# Patient Record
Sex: MALE | Race: BLACK OR AFRICAN AMERICAN
[De-identification: names, ages, dates, MRNs, and addresses within clinical notes are randomized per-mention and may not be internally consistent; named-entity substitution may affect disease eponyms.]

---

## 2017-09-26 ENCOUNTER — HOSPITAL ENCOUNTER (OUTPATIENT)
Dept: HOSPITAL 72 - PAN | Age: 82
Discharge: HOME | End: 2017-09-26
Payer: MEDICARE

## 2017-09-26 VITALS — BODY MASS INDEX: 26.16 KG/M2 | HEIGHT: 65 IN | WEIGHT: 157 LBS

## 2017-09-26 VITALS — DIASTOLIC BLOOD PRESSURE: 51 MMHG | SYSTOLIC BLOOD PRESSURE: 135 MMHG

## 2017-09-26 DIAGNOSIS — K21.9: ICD-10-CM

## 2017-09-26 DIAGNOSIS — D64.9: ICD-10-CM

## 2017-09-26 DIAGNOSIS — Z43.1: Primary | ICD-10-CM

## 2017-09-26 DIAGNOSIS — F03.90: ICD-10-CM

## 2017-09-26 DIAGNOSIS — N18.6: ICD-10-CM

## 2017-09-26 DIAGNOSIS — M19.90: ICD-10-CM

## 2017-09-26 DIAGNOSIS — K86.1: ICD-10-CM

## 2017-09-26 DIAGNOSIS — E03.9: ICD-10-CM

## 2017-09-26 DIAGNOSIS — I12.0: ICD-10-CM

## 2017-09-26 PROCEDURE — 99201: CPT

## 2017-09-26 NOTE — GI INITIAL CONSULT NOTE
History of Present Illness


General


Date patient seen:  Sep 26, 2017


Time patient seen:  10:00


Referring physician:  REESE


Reason for Consultation:  GTUBE REMOVAL





Present Illness


HPI


81 year old male patient referred by Dr. Dean for G Tube removal today.  The 

patient had the Gtube palced in 2016 due to dental issues and was unable to eat 

at the time.  Currently okay with oral intake and PO meds.  Has not been using 

the GT.  Denies any weight loss or changes in dietary habits.


Home Meds


Reported Medications


Acetaminophen* (ACETAMINOPHEN 325MG TABLET*) 325 Mg Tablet, 650 MG ORAL Q4H Y 

for Pain Scale (3-5), TAB


   9/26/17


Tamsulosin Hcl (TAMSULOSIN HCL*) 0.4 Mg Cap.er.24h, 0.4 MG GT BEDTIME, CAP


   9/26/17


Omeprazole (OMEPRAZOLE) 40 Mg Capsule.dr, 40 MG GT DAILY, CAP


   9/26/17


Olanzapine* (ZYPREXA*) 2.5 Mg Tablet, 2.5 MG GT DAILY, #30 TAB 0 Refills


   9/26/17


Vitamin B Cmplx/Vit C/Folic AC (Nephro-Saadia Tablet) 0.8 Mg Tablet, 1 TAB GT 

DAILY, #30 TAB 0 Refills


   9/26/17


Minoxidil* (LONITEN*) 2.5 Mg Tablet, 5 MG GT DAILY, TAB


   9/26/17


Magnesium Hydroxide* (MILK OF MAGNESIA*) 400 Mg/5 Ml Oral.susp, 30 ML ORAL PRN, 

ML


   9/26/17


Lorazepam* (LORAZEPAM*) 1 Mg Tablet, 1 MG ORAL Q8HR, TAB


   9/26/17


Levothyroxine Sodium* (LEVOTHYROXINE SODIUM*) 125 Mcg Tablet, 125 MCG GT DAILY, 

TAB


   Take in the morning on an empty stomach, at least 30 minutes before


   food.


   9/26/17


Levetiracetam* (LEVETIRACETAM*) 100 Mg/1 Ml Solution, 500 MG GT BID


   9/26/17


Labetalol Hcl* (NORMODYNE*) 100 Mg Tablet, 400 MG GT EVERY 12 HOURS, TAB


   9/26/17


Hydralazine Hcl* (HYDRALAZINE HCL*) 25 Mg Tablet, 25 MG GT Q6HR, TAB 0 Refills


   9/26/17


Cranberry Fruit Concentrate (CRANBERRY) 450 Mg Tablet, 450 MG GT DAILY, TAB


   9/26/17


Docusate Sodium* (COLACE*) 100 Mg Capsule, 100 MG GT DAILY, CAP


   9/26/17


Amlodipine Besylate* (AMLODIPINE BESYLATE*) 5 Mg Tablet, 5 MG GT DAILY, TAB


   9/26/17


Med list reviewed/reconciled:  Yes


Allergies:  


Coded Allergies:  


     No Known Allergies (Unverified , 9/26/17)





Patient History


History Provided By:  Patient


PMH Narrative


Anemia


ESRD


NSTEMI


hypothyroidism


chronic pancreatitis


arthritis


dementia


GERD


HTN


Social History:  Denies: smoking, alcohol use, drug use, other





Review of Systems


All Other Systems:  limited





Physical Exam





Vital Signs








  Date Time  Temp Pulse Resp B/P (MAP) Pulse Ox O2 Delivery O2 Flow Rate FiO2


 


9/26/17 09:38 98.1 65 16 135/51    








Sp02 EP Interpretation:  reviewed


General Appearance:  well appearing, no apparent distress, alert


Head:  normocephalic


EENT:  PERRL/EOMI, normal ENT inspection


Neck:  supple


Respiratory:  normal breath sounds, no respiratory distress


Cardiovascular:  regular rhythm


Gastrointestinal:  normal inspection, non tender, soft, gt - present


Rectal:  deferred


Genitourinary:  no CVA tenderness


Musculoskeletal:  normal inspection, back normal, other - ambulates with cane


Neurologic:  normal inspection, alert, oriented x3, responsive


Psychiatric:  normal inspection, other - forgetful


Skin:  normal inspection, normal color, no rash, warm/dry


Lymphatic:  normal inspection, no adenopathy





GI: Plan


Problems:  


(1) PEG (percutaneous endoscopic gastrostomy) adjustment/replacement/removal


(2) Anemia


(3) ESRD (end stage renal disease)


(4) Hypothyroidism


(5) Chronic pancreatitis


(6) Arthritis


(7) Dementia


(8) GERD (gastroesophageal reflux disease)


(9) HTN (hypertension)


Plan


GT removed, patient tolerated procedure well.


- do not shower for 24 hours.


- dressing change prn


RTC x 3 months


will consider colonoscopy





Thank you for referring this patient.











Chani Lopez N.P. Sep 26, 2017 11:07

## 2018-01-09 ENCOUNTER — HOSPITAL ENCOUNTER (OUTPATIENT)
Dept: HOSPITAL 72 - PAN | Age: 83
Discharge: HOME | End: 2018-01-09
Payer: MEDICARE

## 2018-01-09 VITALS — SYSTOLIC BLOOD PRESSURE: 124 MMHG | DIASTOLIC BLOOD PRESSURE: 42 MMHG

## 2018-01-09 DIAGNOSIS — Z43.1: Primary | ICD-10-CM

## 2018-01-09 DIAGNOSIS — F03.90: ICD-10-CM

## 2018-01-09 DIAGNOSIS — D64.9: ICD-10-CM

## 2018-01-09 PROCEDURE — 99212 OFFICE O/P EST SF 10 MIN: CPT

## 2018-01-09 NOTE — GI PROGRESS NOTE
Assessment/Plan


Problems:  


(1) Anemia


ICD Codes:  D64.9 - Anemia, unspecified


SNOMED:  294425884


(2) Dementia


ICD Codes:  F03.90 - Unspecified dementia without behavioral disturbance


SNOMED:  78944585


(3) PEG (percutaneous endoscopic gastrostomy) adjustment/replacement/removal


ICD Codes:  Z43.1 - Encounter for attention to gastrostomy


SNOMED:  645905385, 450145255


Status:  stable


Status Narrative


Discussed with Dr. Gupta.


Assessment/Plan


anemia labs reviewed >>


- Hgb 9.0, Sept 2017


- Hgb 14.9, 1/2/18.


s/p GT site removal assessed.


cont plan of care


RTC x 6 months





Subjective


Subjective


denies blood in stool


decrease in appetite





Objective


T 97.8


/42


P 63


98 RA








98 RA


General Appearance:  WD/WN, no apparent distress, alert


Cardiovascular:  normal rate


Respiratory/Chest:  normal breath sounds, no respiratory distress


Abdominal Exam:  normal bowel sounds, non tender, soft


Extremities:  normal range of motion, non-tender











Chani Lopez N.PMoody Jan 9, 2018 10:50

## 2018-03-06 ENCOUNTER — HOSPITAL ENCOUNTER (OUTPATIENT)
Dept: HOSPITAL 72 - ANE | Age: 83
Discharge: HOME | End: 2018-03-06
Payer: MEDICARE

## 2018-03-06 VITALS — DIASTOLIC BLOOD PRESSURE: 75 MMHG | SYSTOLIC BLOOD PRESSURE: 129 MMHG

## 2018-03-06 DIAGNOSIS — D64.9: Primary | ICD-10-CM

## 2018-03-07 NOTE — GI PROGRESS NOTE
Assessment/Plan


Problems:  


(1) Colonoscopy planned


SNOMED:  916847587


(2) Anemia


ICD Codes:  D64.9 - Anemia, unspecified


SNOMED:  711561070


Status:  stable


Status Narrative


Seen with Dr. Gupta.


Assessment/Plan


EGD/colonoscopy scheduled 3/12/18


- CLD & (Nulytely/Suprep/Movi-Prep) prep instructions given and acknowledged by 

patient.


- NPO @ [facility] day prior procedure explained.


The patient was seen and examined at bedside and all new and available data was 

reviewed in the patients chart. I agree with the above findings, impression 

and plan.  (Patient seen earlier today. Signature stamp does not reflect 

patient encounter time.). - Chata Gupta MD





Subjective


Gastrointestinal/Abdominal:  Reports: no symptoms


Subjective


s/p GT removal


weight loss





Objective


T 98.1


/45


P 68


General Appearance:  WD/WN, no apparent distress, alert


Cardiovascular:  normal rate


Respiratory/Chest:  normal breath sounds, no respiratory distress


Abdominal Exam:  normal bowel sounds, non tender, soft


Extremities:  normal range of motion, non-tender











Chani Lopez N.PMoody Mar 7, 2018 15:33


FATOU GUPTA Mar 14, 2018 11:30

## 2018-03-13 NOTE — ANETHESIA PREOPERATIVE EVAL
Anesthesia Pre-op PMH/ROS


General


Date of Evaluation:  Mar 13, 2018


Time of Evaluation:  06:54


Anesthesiologist:  juan r


ASA Score:  ASA 4


Mallampati Score


Class I : Soft palate, uvula, fauces, pillars visible


Class II: Soft palate, uvula, fauces visible


Class III: Soft palate, base of uvula visible


Class IV: Only hard plate visible


Mallampati Classification:  Class II


Surgeon:  jaymie


Diagnosis:  anemia


Surgical Procedure:  egd/colonoscopy


Anesthesia History:  none


Family History:  no anesthesia problems


Allergies:  


Coded Allergies:  


     No Known Allergies (Unverified , 9/26/17)


Medications:  see eMAR





Past Medical History


Cardiovascular:  Reports: HTN


Pulmonary:  Reports: COPD


Gastrointestinal/Genitourinary:  Reports: GERD, ESRD, other - pancreatitis


Neurologic/Psychiatric:  Reports: dementia, CVA


Endocrine:  Reports: DM, hypothyroidism


Hematology/Immune:  Reports: anemia


Musculoskeletal/Integumentary:  Reports: OA





Anesthesia Pre-op Phys. Exam


Physician Exam


Constitutional:  NAD


Neurologic:  CN 2-12 intact


Cardiovascular:  RRR


Respiratory:  CTA


Gastrointestinal:  S/NT/ND





Airway Exam


Mallampati Score:  Class II


MO:  limited


Neck:  short


TMD:  2fb


ROM:  limited





Anesthesia Pre-op A/P


Risk Assessment & Plan


Assessment:


asa4


Plan:


mac


Status Change Before Surgery:  No





Pre-Antibiotics


Drug:  SHELLI Tejada Mar 13, 2018 07:01

## 2018-03-20 ENCOUNTER — HOSPITAL ENCOUNTER (OUTPATIENT)
Dept: HOSPITAL 72 - GAS | Age: 83
Discharge: HOME | End: 2018-03-20
Payer: MEDICARE

## 2018-03-20 VITALS — DIASTOLIC BLOOD PRESSURE: 55 MMHG | SYSTOLIC BLOOD PRESSURE: 151 MMHG

## 2018-03-20 VITALS — SYSTOLIC BLOOD PRESSURE: 120 MMHG | DIASTOLIC BLOOD PRESSURE: 49 MMHG

## 2018-03-20 VITALS — DIASTOLIC BLOOD PRESSURE: 46 MMHG | SYSTOLIC BLOOD PRESSURE: 145 MMHG

## 2018-03-20 VITALS — SYSTOLIC BLOOD PRESSURE: 141 MMHG | DIASTOLIC BLOOD PRESSURE: 52 MMHG

## 2018-03-20 VITALS — DIASTOLIC BLOOD PRESSURE: 55 MMHG | SYSTOLIC BLOOD PRESSURE: 140 MMHG

## 2018-03-20 VITALS — WEIGHT: 150 LBS | BODY MASS INDEX: 22.73 KG/M2 | HEIGHT: 68 IN

## 2018-03-20 VITALS — SYSTOLIC BLOOD PRESSURE: 142 MMHG | DIASTOLIC BLOOD PRESSURE: 55 MMHG

## 2018-03-20 VITALS — DIASTOLIC BLOOD PRESSURE: 75 MMHG | SYSTOLIC BLOOD PRESSURE: 154 MMHG

## 2018-03-20 DIAGNOSIS — K29.50: ICD-10-CM

## 2018-03-20 DIAGNOSIS — K63.5: ICD-10-CM

## 2018-03-20 DIAGNOSIS — K29.70: ICD-10-CM

## 2018-03-20 DIAGNOSIS — M19.90: ICD-10-CM

## 2018-03-20 DIAGNOSIS — D12.3: ICD-10-CM

## 2018-03-20 DIAGNOSIS — N18.6: ICD-10-CM

## 2018-03-20 DIAGNOSIS — Z99.2: ICD-10-CM

## 2018-03-20 DIAGNOSIS — Z12.11: Primary | ICD-10-CM

## 2018-03-20 DIAGNOSIS — J44.9: ICD-10-CM

## 2018-03-20 DIAGNOSIS — K64.8: ICD-10-CM

## 2018-03-20 DIAGNOSIS — K44.9: ICD-10-CM

## 2018-03-20 DIAGNOSIS — Z79.82: ICD-10-CM

## 2018-03-20 DIAGNOSIS — I13.11: ICD-10-CM

## 2018-03-20 DIAGNOSIS — K21.9: ICD-10-CM

## 2018-03-20 DIAGNOSIS — K22.2: ICD-10-CM

## 2018-03-20 DIAGNOSIS — D64.9: ICD-10-CM

## 2018-03-20 DIAGNOSIS — E03.9: ICD-10-CM

## 2018-03-20 DIAGNOSIS — I25.2: ICD-10-CM

## 2018-03-20 DIAGNOSIS — E11.22: ICD-10-CM

## 2018-03-20 PROCEDURE — 45380 COLONOSCOPY AND BIOPSY: CPT

## 2018-03-20 PROCEDURE — 36415 COLL VENOUS BLD VENIPUNCTURE: CPT

## 2018-03-20 PROCEDURE — 84132 ASSAY OF SERUM POTASSIUM: CPT

## 2018-03-20 PROCEDURE — 82962 GLUCOSE BLOOD TEST: CPT

## 2018-03-20 PROCEDURE — 93005 ELECTROCARDIOGRAM TRACING: CPT

## 2018-03-20 PROCEDURE — 43239 EGD BIOPSY SINGLE/MULTIPLE: CPT

## 2018-03-20 PROCEDURE — 94150 VITAL CAPACITY TEST: CPT

## 2018-03-20 PROCEDURE — 94003 VENT MGMT INPAT SUBQ DAY: CPT

## 2018-03-20 NOTE — 48 HOUR POST ANESTHESIA EVAL
Post Anesthesia Evaluation


Procedure:  EGD and colonoscopy


Date of Evaluation:  Mar 20, 2018


Time of Evaluation:  14:30


Blood Pressure Systolic:  142


0:  55


Pulse Rate:  72


Respiratory Rate:  20


Temperature (Fahrenheit):  98


O2 Sat by Pulse Oximetry:  100


Airway:  patent


Nausea:  No


Vomiting:  No


Pain Intensity:  0


Hydration Status:  adequate


Cardiopulmonary Status:


at baseline


Mental Status/LOC:  patient returned to baseline


Post-Anesthesia Complications:


0


Follow-up care needed:  ready to discharge











MADY MOSCOSO M.D. Mar 20, 2018 12:10

## 2018-03-20 NOTE — SHORT STAY SURGERY H&P
History of Present Illness


History of Present Illness


Chief Complaint


see recent office note


HPI


Mansoor Horton is a 82 year old male who was admitted on  for Anemia





Patient History


Allergies:  


Coded Allergies:  


     No Known Allergies (Unverified , 3/20/18)


PAST MEDICAL HISTORY:  


Past Surgeries:  


Social History:  





Medication History


Scheduled


Amlodipine Besylate* (Amlodipine Besylate*), 5 MG PO BID, (Reported)


Aspirin* (Aspir 81*), 81 MG ORAL DAILY, (Reported)


Divalproex Sodium* (Depakote Er*), 250 MG ORAL TID, (Reported)


Hydralazine Hcl* (Hydralazine Hcl*), 25 MG PO QID, (Reported)


Labetalol Hcl* (Normodyne*), 400 MG PO EVERY 12 HOURS, (Reported)


Levetiracetam* (Levetiracetam*), 500 MG PO BID, (Reported)


Levothyroxine Sodium* (Levothyroxine Sodium*), 125 MCG PO DAILY, (Reported)


Minoxidil* (Loniten*), 5 MG PO DAILY, (Reported)


Tamsulosin Hcl (Tamsulosin Hcl*), 0.4 MG PO BEDTIME, (Reported)


Vitamin B Cmplx/Vit C/Folic AC (Nephro-Saadia Tablet), 1 TAB PO DAILY, (Reported)





Discontinued Medications


Acetaminophen* (Acetaminophen 325MG Tablet*), 650 MG ORAL Q4H PRN for Pain 

Scale (3-5), (Reported)


   Discontinued Reason: Pt stopped taking med


Cranberry Fruit Concentrate (Cranberry), 450 MG GT DAILY, (Reported)


   Discontinued Reason: Pt stopped taking med


Docusate Sodium* (Colace*), 100 MG GT DAILY, (Reported)


   Discontinued Reason: Pt stopped taking med


Lorazepam* (Lorazepam*), 1 MG ORAL Q8HR, (Reported)


   Discontinued Reason: Pt stopped taking med


Magnesium Hydroxide* (Milk Of Magnesia*), 30 ML ORAL PRN, (Reported)


   Discontinued Reason: Pt stopped taking med


Olanzapine* (Zyprexa*), 2.5 MG GT DAILY, (Reported)


   Discontinued Reason: Pt stopped taking med


Omeprazole (Omeprazole), 40 MG GT DAILY, (Reported)


   Discontinued Reason: Pt stopped taking med





Physical Exam


Vital Signs





Last Vital Signs








  Date Time  Temp Pulse Resp B/P (MAP) Pulse Ox O2 Delivery O2 Flow Rate FiO2


 


3/20/18 10:54 98.3 67 18 154/75 99 Room Air  





 98.3       








Labs





Laboratory Tests








Test


  3/20/18


10:20


 


Potassium Level


  4.9 MMOL/L


(3.5-5.1)











Plan


Attestation


Are the patient's medical conditions optimized for surgery?











FATOU CRUZ Mar 20, 2018 12:25

## 2018-03-20 NOTE — PRE-PROCEDURE NOTE/ATTESTATION
Pre-Procedure Note/Attestation


Complete Prior to Procedure


Planned Procedure:  not applicable


Procedure Narrative:


esophagogastroduodenoscopy and colonoscopy





Indications for Procedure


Pre-Operative Diagnosis:


screening colon, GERD





Attestation


I attest that I discussed the nature of the procedure; its benefits; risks and 

complications; and alternatives (and the risks and benefits of such alternatives

), prior to the procedure, with the patient (or the patient's legal 

representative).





I attest that, if there was a reasonable possibility of needing a blood 

transfusion, the patient (or the patient's legal representative) was given the 

[facility] standardized written summary, pursuant 

to the Jermaine Burley Blood Safety Act ([place] Health and Safety Code # 1645, as 

amended).





I attest that I re-evaluated the patient just prior to the surgery and that 

there has been no change in the patient's H&P, except as documented below:











FATOU CRUZ Mar 20, 2018 12:24

## 2018-03-20 NOTE — ANETHESIA PREOPERATIVE EVAL
Anesthesia Pre-op PMH/ROS


General


Date of Evaluation:  Mar 20, 2018


Anesthesiologist:  [place]


ASA Score:  ASA 4


Mallampati Score


Class I : Soft palate, uvula, fauces, pillars visible


Class II: Soft palate, uvula, fauces visible


Class III: Soft palate, base of uvula visible


Class IV: Only hard plate visible


Mallampati Classification:  Class II


Surgeon:  Candy


Diagnosis:  ?GI bleed


Surgical Procedure:  EGD and colonoscopy


Anesthesia History:  none


Family History:  no anesthesia problems


Allergies:  


Coded Allergies:  


     No Known Allergies (Unverified , 3/20/18)


Medications:  see eMAR





Past Medical History


Cardiovascular:  Reports: HTN, CAD, MI, other - h/o NSTEMI, 


   Denies: valve dz, arrhythmia


Pulmonary:  Reports: COPD, 


   Denies: asthma, BANDAR, other


Gastrointestinal/Genitourinary:  Reports: GERD, ESRD - on dialysis-M,W,F, 


   Denies: CRI, other


Neurologic/Psychiatric:  Reports: dementia, CVA, depression/anxiety, other - 

seizures, schizophrenia, 


   Denies: TIA


Endocrine:  Reports: DM, hypothyroidism, 


   Denies: steroids, other


HEENT:  Denies: cataract (L), cataract (R), glaucoma, [place] (L), [place] (R), other


Hematology/Immune:  Reports: anemia, 


   Denies: DVT, bleeding disorder, other


Musculoskeletal/Integumentary:  Reports: OA, DJD, 


   Denies: RA, DDD, edema, other


PSxH Narrative:


Fistula





Anesthesia Pre-op Phys. Exam


Physician Exam


see chart


Constitutional:  NAD


Cardiovascular:  RRR


Respiratory:  CTA





Airway Exam


Mallampati Score:  Class II


MO:  full


ROM:  full


Teeth:  intact





Anesthesia Pre-op A/P


Labs





Chemistry








Test


  3/20/18


10:20


 


Potassium Level


  4.9 MMOL/L


(3.5-5.1)











Studies


Pre-op Studies:  EKG - sr





Risk Assessment & Plan


Assessment:


ASA IV


Plan:


MAC


Status Change Before Surgery:  No





Pre-Antibiotics


Drug:  N/A











MADY MOSCOSO M.D. Mar 20, 2018 10:51

## 2018-03-20 NOTE — ENDOSCOPY PROCEDURE NOTE
Endoscopy Procedure Note


General


Indication for Procedure:  SCREENING COLON, gerd


Procedures Performed:  EGD, colonoscopy


Operative Findings/Diagnosis:  ONE COLON POLYP, GASTRITIS


Specimen:  yes


Pt Tolerated Procedure Well:  Yes


Estimated Blood Loss:  none





Anesthesia


Anesthesiologist:  [place]


Anesthesia:  MAC





Inserted Devices


Implant(s) used?:  No





Quality


Quality of Bowel Preparation:  Fair


Did scope reach the cecum?:  Yes


Was there any complications?:  No





GI Core Measures


50 yrs or older w/o bx or poly:  No


10yrs. F/U not recommended:  Yes


If not recommended, why?:  Above average risk


10 yrs. F/U needed:  No











FATOU CRUZ Mar 20, 2018 13:02

## 2018-03-20 NOTE — IMMEDIATE POST-OP EVALUATION
Immediate Post-Op Evalulation


Immediate Post-Op Evalulation


Procedure:  EGD and colonoscopy


Date of Evaluation:  Mar 20, 2018


Time of Evaluation:  13:22


IV Fluids:  300


Blood Products:  0


Estimated Blood Loss:  0


Urinary Output:  0


Blood Pressure Systolic:  151


Blood Pressure Diastolic:  55


Pulse Rate:  69


Respiratory Rate:  18


O2 Sat by Pulse Oximetry:  98


Temperature (Fahrenheit):  97.7


Pain Score (1-10):  0


Nausea:  No


Vomiting:  No


Complications


0


Patient Status:  awake, reacts, patent, none


Hydration Status:  adequate


Drug:  N/A











MADY MOSCOSO M.D. Mar 20, 2018 12:10

## 2018-03-20 NOTE — PROCEDURE NOTE
DATE OF PROCEDURE:  03/20/2018



SURGEON:  Alexy Gupta M.D.



PROCEDURE:  Upper endoscopy with biopsy and colonoscopy with

biopsy.



ANESTHESIA:  Per Dr. Bear.



INSTRUMENT:  Olympus adult flexible upper endoscope and

colonoscope.



INDICATION:  Screening colonoscopy evaluation and chronic anemia.



The procedure, risks, benefits, and possible consequences, including

hemorrhage, aspiration, perforation and infection, and alternative

treatments, were explained to the patient/legal guardian by Dr. Alexy Gupta and the patient/legal guardian understood and accepted these

risks.



DESCRIPTION OF PROCEDURE:  After informed consent was obtained and the

patient was adequately sedated, Olympus upper endoscope was advanced from

the mouth into the second portion of the duodenum and retroflexion was

performed in the stomach.



The patient had a small esophageal ring, small hiatal hernia, diffuse

gastritis.  Random biopsy from body and antrum was obtained to rule out H.

pylori infection.  At this time, the upper endoscope was retrieved.  The

patient was turned over for colonoscopy.



First, rectal exam was performed, which was normal.  Then, the scope was

advanced from the rectum into the cecum.  Quality of prep was poor.



30% of the colonic mucosa was not seen in this examination given the

poor prep.



The patient had one diminutive polyp in the transverse colon, which was

removed with cold biopsy forceps technique.



The patient had retroflexion of the rectum, which showed evidence of a

medium-sized internal hemorrhoid.



The patient tolerated the procedure very well without any

complication.



SUMMARY OF FINDINGS:

1. Distal esophageal ring.

2. Small hiatal hernia.

3. Gastritis, status post biopsy.

4. One colonic polyp, removed, see above for details.

5. Poor colonic prep.

6. Internal hemorrhoids.



RECOMMENDATIONS:  Follow biopsy results and treat accordingly.









  ______________________________________________

  Alexy Gupta M.D.





DR:  MIGUEL

D:  03/20/2018 13:07

T:  03/20/2018 17:44

JOB#:  [id number]

CC:

## 2018-03-21 VITALS — SYSTOLIC BLOOD PRESSURE: 142 MMHG | DIASTOLIC BLOOD PRESSURE: 55 MMHG

## 2018-03-21 NOTE — CARDIOLOGY REPORT
--------------- APPROVED REPORT --------------





EKG Measurement

Heart [id number]

[place] [id number]

[id number]

[id number]

[id number]





Sinus rhythm with 1st degree AV block

Septal infarct, age undetermined

Abnormal ECG

## 2018-03-21 NOTE — PROCEDURE NOTE
DATE OF PROCEDURE:  03/20/2018



NOTE:  POOR AUDIO



SURGEON:  Alexy Gupta M.D.



PROCEDURE:  Upper endoscopy with biopsy and colonoscopy _____.



ANESTHESIA:  Dr. Bear.



INSTRUMENT:  Olympus adult flexible endoscope and colonoscope.



INDICATION:  Anemia and screening colonoscopy evaluation.



REASON FOR PROCEDURE:  The procedure, risks, benefits, and possible

consequences, including hemorrhage, aspiration, perforation and infection,

and alternative treatments, were explained to the patient/legal guardian

by Dr. Alexy Gupta and the patient/legal guardian understood and

accepted these risks.



DESCRIPTION OF PROCEDURE:  After informed consent was obtained and the

patient was adequately sedated, Olympus upper endoscope was advanced from

mouth to second portion of duodenum and retroflexion was performed in the

stomach.  _____ small hiatal hernia, otherwise upper endoscope was normal.

Gastritis was seen in the body and the antrum, which was biopsied to rule

out H. pylori infection.  At this time, the upper endoscope was retrieved

and the patient was turned over for colonoscopy.



First, rectal exam was performed _____.  Then, the scope was advanced

from the rectum into the cecum.  Quality of prep was poor.



_____ see about ______.



_____



SUMMARY OF FINDINGS:

1. _____ gastritis _____.

2. ______ prep ______.

3. ______

4. ______



RECOMMENDATIONS:  Follow up biopsy results and treat accordingly.









  ______________________________________________

  Alexy Gupta M.D.





DR:  MIGUEL

D:  03/20/2018 13:04

T:  03/21/2018 03:07

JOB#:  [id number]

CC:

## 2018-04-19 ENCOUNTER — HOSPITAL ENCOUNTER (OUTPATIENT)
Dept: HOSPITAL 72 - PAN | Age: 83
Discharge: HOME | End: 2018-04-19
Payer: MEDICARE

## 2018-04-19 DIAGNOSIS — K44.9: ICD-10-CM

## 2018-04-19 DIAGNOSIS — K29.70: ICD-10-CM

## 2018-04-19 DIAGNOSIS — K64.8: ICD-10-CM

## 2018-04-19 DIAGNOSIS — K63.5: ICD-10-CM

## 2018-04-19 DIAGNOSIS — K21.9: Primary | ICD-10-CM

## 2018-04-19 DIAGNOSIS — D64.9: ICD-10-CM

## 2018-04-19 DIAGNOSIS — F03.90: ICD-10-CM

## 2018-04-19 LAB
ADD MANUAL DIFF: NO
ALBUMIN SERPL-MCNC: 3.1 G/DL (ref 3.4–5)
ALBUMIN/GLOB SERPL: 0.6 {RATIO} (ref 1–2.7)
ALP SERPL-CCNC: 170 U/L (ref 46–116)
ALT SERPL-CCNC: 18 U/L (ref 12–78)
ANION GAP SERPL CALC-SCNC: 11 MMOL/L (ref 5–15)
AST SERPL-CCNC: 18 U/L (ref 15–37)
BASOPHILS NFR BLD AUTO: 0.5 % (ref 0–2)
BILIRUB SERPL-MCNC: 0.3 MG/DL (ref 0.2–1)
BUN SERPL-MCNC: 56 MG/DL (ref 7–18)
CALCIUM SERPL-MCNC: 9.2 MG/DL (ref 8.5–10.1)
CHLORIDE SERPL-SCNC: 99 MMOL/L (ref 98–107)
CO2 SERPL-SCNC: 26 MMOL/L (ref 21–32)
CREAT SERPL-MCNC: 4.9 MG/DL (ref 0.55–1.3)
EOSINOPHIL NFR BLD AUTO: 12.9 % (ref 0–3)
ERYTHROCYTE [DISTWIDTH] IN BLOOD BY AUTOMATED COUNT: 15.2 % (ref 11.6–14.8)
GLOBULIN SER-MCNC: 4.9 G/DL
HCT VFR BLD CALC: 39.7 % (ref 42–52)
HGB BLD-MCNC: 12.7 G/DL (ref 14.2–18)
LYMPHOCYTES NFR BLD AUTO: 21.1 % (ref 20–45)
MCV RBC AUTO: 89 FL (ref 80–99)
MONOCYTES NFR BLD AUTO: 10.8 % (ref 1–10)
NEUTROPHILS NFR BLD AUTO: 54.7 % (ref 45–75)
PLATELET # BLD: 215 K/UL (ref 150–450)
POTASSIUM SERPL-SCNC: 4.5 MMOL/L (ref 3.5–5.1)
RBC # BLD AUTO: 4.48 M/UL (ref 4.7–6.1)
SODIUM SERPL-SCNC: 136 MMOL/L (ref 136–145)
WBC # BLD AUTO: 5.8 K/UL (ref 4.8–10.8)

## 2018-04-19 PROCEDURE — 36415 COLL VENOUS BLD VENIPUNCTURE: CPT

## 2018-04-19 PROCEDURE — 80053 COMPREHEN METABOLIC PANEL: CPT

## 2018-04-19 PROCEDURE — 85025 COMPLETE CBC W/AUTO DIFF WBC: CPT

## 2018-04-19 PROCEDURE — 99212 OFFICE O/P EST SF 10 MIN: CPT

## 2018-04-19 PROCEDURE — 82378 CARCINOEMBRYONIC ANTIGEN: CPT

## 2018-04-19 NOTE — GI PROGRESS NOTE
Assessment/Plan


Problems:  


(1) Anemia


ICD Codes:  D64.9 - Anemia, unspecified


SNOMED:  931393349


(2) Dementia


ICD Codes:  F03.90 - Unspecified dementia without behavioral disturbance


SNOMED:  61843226


(3) GERD (gastroesophageal reflux disease)


ICD Codes:  K21.9 - Gastro-esophageal reflux disease without esophagitis


SNOMED:  725907711


Status:  unchanged


Status Narrative


Seen with Dr. Gupta.


Assessment/Plan


SUMMARY OF FINDINGS reviewed with patient:


1. Distal esophageal ring.


2. Small hiatal hernia.


3. Gastritis, status post biopsy.


4. One colonic polyp, removed, see above for details.


5. Poor colonic prep.


6. Internal hemorrhoids.





RECOMMENDATIONS:


Follow biopsy results and treat accordingly.


Labs drawn today >> CBC, CEA, , CMP


RTC after lab studies





Subjective


Gastrointestinal/Abdominal:  Reports: no symptoms





Objective


General Appearance:  WD/WN, no apparent distress, alert


Cardiovascular:  normal rate


Respiratory/Chest:  normal breath sounds, no respiratory distress


Abdominal Exam:  normal bowel sounds, non tender, soft


Extremities:  normal range of motion, non-tender











Chani Lopez N.P. Apr 19, 2018 14:47

## 2018-08-30 ENCOUNTER — HOSPITAL ENCOUNTER (OUTPATIENT)
Dept: HOSPITAL 72 - PAN | Age: 83
Discharge: HOME | End: 2018-08-30
Payer: MEDICARE

## 2018-08-30 DIAGNOSIS — F03.90: ICD-10-CM

## 2018-08-30 DIAGNOSIS — Z43.1: Primary | ICD-10-CM

## 2018-08-30 DIAGNOSIS — K21.9: ICD-10-CM

## 2018-08-30 DIAGNOSIS — D64.9: ICD-10-CM

## 2018-08-30 DIAGNOSIS — K59.00: ICD-10-CM

## 2018-08-30 PROCEDURE — 99212 OFFICE O/P EST SF 10 MIN: CPT

## 2018-08-30 NOTE — GI PROGRESS NOTE
Assessment/Plan


Problems:  


(1) Constipation


ICD Codes:  K59.00 - Constipation, unspecified


SNOMED:  76101885


(2) PEG (percutaneous endoscopic gastrostomy) adjustment/replacement/removal


ICD Codes:  Z43.1 - Encounter for attention to gastrostomy


SNOMED:  311884826, 172543766


(3) Anemia


ICD Codes:  D64.9 - Anemia, unspecified


SNOMED:  119261939


(4) GERD (gastroesophageal reflux disease)


ICD Codes:  K21.9 - Gastro-esophageal reflux disease without esophagitis


SNOMED:  634284378


(5) Dementia


ICD Codes:  F03.90 - Unspecified dementia without behavioral disturbance


SNOMED:  74857982


Status:  stable


Status Narrative


Seen with Dr. Gupta.


Assessment/Plan


EGD/colonoscopy done 03/2018.


s/p GT removal 5/2018


site healed without any complications


patient tolerating diet





Rx miralax 


RTC x 6 months/prn





The patient was seen and examined at bedside and all new and available data was 

reviewed in the patients chart. I agree with the above findings, impression 

and plan.  (Patient seen earlier today. Signature stamp does not reflect 

patient encounter time.). - Alexy Gupta MD





Subjective


Subjective


occasional constipation





Objective


T 98.1


/50


P 60


99 RA


General Appearance:  WD/WN, no apparent distress, alert


Cardiovascular:  normal rate


Respiratory/Chest:  normal breath sounds, no respiratory distress


Abdominal Exam:  normal bowel sounds, non tender, soft


Extremities:  non-tender











Iesha Lopez NP Aug 30, 2018 13:51

## 2018-12-07 ENCOUNTER — HOSPITAL ENCOUNTER (INPATIENT)
Dept: HOSPITAL 72 - EMR | Age: 83
LOS: 3 days | Discharge: NURSING, REHAB OR LONG-TERM CARE | DRG: 811 | End: 2018-12-10
Payer: MEDICARE

## 2018-12-07 VITALS — DIASTOLIC BLOOD PRESSURE: 52 MMHG | SYSTOLIC BLOOD PRESSURE: 107 MMHG

## 2018-12-07 VITALS — WEIGHT: 167 LBS | HEIGHT: 68 IN | BODY MASS INDEX: 25.31 KG/M2

## 2018-12-07 VITALS — SYSTOLIC BLOOD PRESSURE: 111 MMHG | DIASTOLIC BLOOD PRESSURE: 59 MMHG

## 2018-12-07 VITALS — DIASTOLIC BLOOD PRESSURE: 46 MMHG | SYSTOLIC BLOOD PRESSURE: 115 MMHG

## 2018-12-07 DIAGNOSIS — F09: ICD-10-CM

## 2018-12-07 DIAGNOSIS — D63.1: ICD-10-CM

## 2018-12-07 DIAGNOSIS — M19.90: ICD-10-CM

## 2018-12-07 DIAGNOSIS — I12.0: ICD-10-CM

## 2018-12-07 DIAGNOSIS — F03.90: ICD-10-CM

## 2018-12-07 DIAGNOSIS — E11.22: ICD-10-CM

## 2018-12-07 DIAGNOSIS — Z99.2: ICD-10-CM

## 2018-12-07 DIAGNOSIS — S72.002D: ICD-10-CM

## 2018-12-07 DIAGNOSIS — N40.0: ICD-10-CM

## 2018-12-07 DIAGNOSIS — N18.6: ICD-10-CM

## 2018-12-07 DIAGNOSIS — I25.2: ICD-10-CM

## 2018-12-07 DIAGNOSIS — G40.909: ICD-10-CM

## 2018-12-07 DIAGNOSIS — K21.9: ICD-10-CM

## 2018-12-07 DIAGNOSIS — D50.0: Primary | ICD-10-CM

## 2018-12-07 DIAGNOSIS — Z79.82: ICD-10-CM

## 2018-12-07 DIAGNOSIS — E03.9: ICD-10-CM
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
## 2018-12-07 LAB
ADD MANUAL DIFF: YES
ALBUMIN SERPL-MCNC: 2.3 G/DL (ref 3.4–5)
ALBUMIN/GLOB SERPL: 0.4 {RATIO} (ref 1–2.7)
ALP SERPL-CCNC: 90 U/L (ref 46–116)
ALT SERPL-CCNC: 11 U/L (ref 12–78)
ANION GAP SERPL CALC-SCNC: 11 MMOL/L (ref 5–15)
AST SERPL-CCNC: 28 U/L (ref 15–37)
BILIRUB SERPL-MCNC: 0.4 MG/DL (ref 0.2–1)
BUN SERPL-MCNC: 43 MG/DL (ref 7–18)
CALCIUM SERPL-MCNC: 8.8 MG/DL (ref 8.5–10.1)
CHLORIDE SERPL-SCNC: 105 MMOL/L (ref 98–107)
CO2 SERPL-SCNC: 24 MMOL/L (ref 21–32)
CREAT SERPL-MCNC: 6.1 MG/DL (ref 0.55–1.3)
ERYTHROCYTE [DISTWIDTH] IN BLOOD BY AUTOMATED COUNT: 13.8 % (ref 11.6–14.8)
GLOBULIN SER-MCNC: 5.4 G/DL
HCT VFR BLD CALC: 23.4 % (ref 42–52)
HGB BLD-MCNC: 7.3 G/DL (ref 14.2–18)
MCV RBC AUTO: 84 FL (ref 80–99)
PLATELET # BLD: 203 K/UL (ref 150–450)
POTASSIUM SERPL-SCNC: 4.6 MMOL/L (ref 3.5–5.1)
RBC # BLD AUTO: 2.79 M/UL (ref 4.7–6.1)
SODIUM SERPL-SCNC: 140 MMOL/L (ref 136–145)
WBC # BLD AUTO: 8.9 K/UL (ref 4.8–10.8)

## 2018-12-07 PROCEDURE — 30233N1 TRANSFUSION OF NONAUTOLOGOUS RED BLOOD CELLS INTO PERIPHERAL VEIN, PERCUTANEOUS APPROACH: ICD-10-PCS

## 2018-12-07 PROCEDURE — 86901 BLOOD TYPING SEROLOGIC RH(D): CPT

## 2018-12-07 PROCEDURE — 36415 COLL VENOUS BLD VENIPUNCTURE: CPT

## 2018-12-07 PROCEDURE — 80053 COMPREHEN METABOLIC PANEL: CPT

## 2018-12-07 PROCEDURE — 86850 RBC ANTIBODY SCREEN: CPT

## 2018-12-07 PROCEDURE — 85007 BL SMEAR W/DIFF WBC COUNT: CPT

## 2018-12-07 PROCEDURE — 84100 ASSAY OF PHOSPHORUS: CPT

## 2018-12-07 PROCEDURE — 80048 BASIC METABOLIC PNL TOTAL CA: CPT

## 2018-12-07 PROCEDURE — 86920 COMPATIBILITY TEST SPIN: CPT

## 2018-12-07 PROCEDURE — 87081 CULTURE SCREEN ONLY: CPT

## 2018-12-07 PROCEDURE — 99285 EMERGENCY DEPT VISIT HI MDM: CPT

## 2018-12-07 PROCEDURE — 86900 BLOOD TYPING SEROLOGIC ABO: CPT

## 2018-12-07 PROCEDURE — 85025 COMPLETE CBC W/AUTO DIFF WBC: CPT

## 2018-12-07 PROCEDURE — 93005 ELECTROCARDIOGRAM TRACING: CPT

## 2018-12-07 RX ADMIN — HEPARIN SODIUM SCH UNITS: 5000 INJECTION INTRAVENOUS; SUBCUTANEOUS at 20:54

## 2018-12-07 RX ADMIN — TAMSULOSIN HYDROCHLORIDE SCH MG: 0.4 CAPSULE ORAL at 20:52

## 2018-12-07 RX ADMIN — LABETALOL HCL SCH MG: 200 TABLET, FILM COATED ORAL at 20:52

## 2018-12-07 RX ADMIN — SODIUM CHLORIDE SCH MLS/HR: 0.9 INJECTION INTRAVENOUS at 20:49

## 2018-12-07 RX ADMIN — MINOXIDIL SCH MG: 2.5 TABLET ORAL at 21:00

## 2018-12-07 RX ADMIN — LEVETIRACETAM SCH MG: 100 SOLUTION ORAL at 20:51

## 2018-12-07 NOTE — EMERGENCY ROOM REPORT
History of Present Illness


General


Chief Complaint:  Abnormal Labs


Source:  Medical Record





Present Illness


HPI


Mr. Horton is an 82 yo male with hx of ESRD who was brought from SNF for 

evaluation of low hbg per outpatient labs.  Dr. Kim is aware of patient.  

Patient denies pain.  Hx is limited due to patient's baseline mental status.


Allergies:  


Coded Allergies:  


     No Known Allergies (Unverified , 3/20/18)





Patient History


Limited by:  age, medical condition


Past Medical History:  see triage record, old chart reviewed


Past Surgical History:  unable to obtain


Social History:  Denies: smoking, alcohol use, drug use


Reviewed Nursing Documentation:  PMH: Agreed; PSxH: Agreed





Nursing Documentation-PMH


Past Medical History:  No History, Except For


Hx Cardiac Problems:  Yes - hypothyroism, anemia, HF


Hx Hypertension:  Yes


Hx COPD:  Yes


Hx Diabetes:  Yes - type II


Hx Cancer:  No


Hx Gastrointestinal Problems:  Yes - dysphagia, g-tube, GERD


Hx Dialysis:  Yes - [facility]


Hx Neurological Problems:  Yes - s/p left hip fx on 11/18, muscle weakness


Hx Cerebrovascular Accident:  Yes


Hx Transient Ischemic Attacks:  Yes


Hx Dementia:  Yes


Hx Seizures:  Yes





Review of Systems


All Other Systems:  limited - elderly patient





Physical Exam





Vital Signs








  Date Time  Temp Pulse Resp B/P (MAP) Pulse Ox O2 Delivery O2 Flow Rate FiO2


 


12/7/18 11:15 97.0 63 16 107/52 100 Room Air  








Sp02 EP Interpretation:  reviewed, normal


General Appearance:  normal inspection, no apparent distress, alert, non-toxic, 

Chronically Ill


Eyes:  bilateral eye normal inspection


ENT:  hearing grossly normal, normal pharynx, no angioedema, normal voice


Neck:  normal inspection, full range of motion, supple


Respiratory:  normal inspection, chest non-tender, lungs clear, normal breath 

sounds, no rhonchi, no respiratory distress, no retraction


Cardiovascular #1:  normal inspection, regular rate, rhythm, no gallop, no rub


Gastrointestinal:  normal inspection, normal bowel sounds, non tender, soft


Rectal:  normal exam, normal rectal tone, heme negative stool, other - brown 

stool


Musculoskeletal:  back normal


Neurologic:  alert, responsive


Psychiatric:  other - pleasant slightly confused


Skin:  normal inspection, normal color





Medical Decision Making


Diagnostic Impression:  


 Primary Impression:  


 Anemia


 Additional Impression:  


 ESRD (end stage renal disease) on dialysis


ER Course


Severe anemia confirmed on labs today, attributed to anemia of chronic disease, 

no indication of GI bleed, tranfusion and type and cross ordered, Dr. Kim 

will admit for transfusion and dialysis to telemetry





Labs








Test


  12/7/18


11:24


 


White Blood Count


  8.9 K/UL


(4.8-10.8)


 


Red Blood Count


  2.79 M/UL


(4.70-6.10)


 


Hemoglobin


  7.3 G/DL


(14.2-18.0)


 


Hematocrit


  23.4 %


(42.0-52.0)


 


Mean Corpuscular Volume 84 FL (80-99) 


 


Mean Corpuscular Hemoglobin


  26.3 PG


(27.0-31.0)


 


Mean Corpuscular Hemoglobin


Concent 31.3 G/DL


(32.0-36.0)


 


Red Cell Distribution Width


  13.8 %


(11.6-14.8)


 


Platelet Count


  203 K/UL


(150-450)


 


Mean Platelet Volume


  6.3 FL


(6.5-10.1)


 


Neutrophils (%) (Auto)  % (45.0-75.0) 


 


Lymphocytes (%) (Auto)  % (20.0-45.0) 


 


Monocytes (%) (Auto)  % (1.0-10.0) 


 


Eosinophils (%) (Auto)  % (0.0-3.0) 


 


Basophils (%) (Auto)  % (0.0-2.0) 


 


Differential Total Cells


Counted 100 


 


 


Neutrophils % (Manual) 69 % (45-75) 


 


Lymphocytes % (Manual) 15 % (20-45) 


 


Monocytes % (Manual) 5 % (1-10) 


 


Eosinophils % (Manual) 10 % (0-3) 


 


Basophils % (Manual) 0 % (0-2) 


 


Band Neutrophils 1 % (0-8) 


 


Platelet Estimate Adequate 


 


Platelet Morphology Normal 


 


Hypochromasia 1+ 


 


Sodium Level


  140 MMOL/L


(136-145)


 


Potassium Level


  4.6 MMOL/L


(3.5-5.1)


 


Chloride Level


  105 MMOL/L


()


 


Carbon Dioxide Level


  24 MMOL/L


(21-32)


 


Anion Gap


  11 mmol/L


(5-15)


 


Blood Urea Nitrogen


  43 mg/dL


(7-18)


 


Creatinine


  6.1 MG/DL


(0.55-1.30)


 


Estimat Glomerular Filtration


Rate  mL/min (>60) 


 


 


Glucose Level


  193 MG/DL


()


 


Calcium Level


  8.8 MG/DL


(8.5-10.1)


 


Total Bilirubin


  0.4 MG/DL


(0.2-1.0)


 


Aspartate Amino Transf


(AST/SGOT) 28 U/L (15-37) 


 


 


Alanine Aminotransferase


(ALT/SGPT) 11 U/L (12-78) 


 


 


Alkaline Phosphatase


  90 U/L


()


 


Total Protein


  7.7 G/DL


(6.4-8.2)


 


Albumin


  2.3 G/DL


(3.4-5.0)


 


Globulin 5.4 g/dL 


 


Albumin/Globulin Ratio 0.4 (1.0-2.7) 








EKG Diagnostic Results


EKG Time:  11:27


Rate:  bradycardiac


ST Segments:  no acute changes


Other Impression


Sinus bradycardia rate 55 bpm nl aixs no ST elevation, nonspecific T wave 

pattern





Last Vital Signs








  Date Time  Temp Pulse Resp B/P (MAP) Pulse Ox O2 Delivery O2 Flow Rate FiO2


 


12/7/18 11:16 97.9 72 18 104/44 95 Room Air  








Status:  unchanged


Disposition:  PLACE IN OBSERVATION


Condition:  Stable


Referrals:  


NOT CHOSEN IPA/MD,REFERRING (PCP)











Deisy Banegas MD Dec 7, 2018 14:00

## 2018-12-08 VITALS — SYSTOLIC BLOOD PRESSURE: 131 MMHG | DIASTOLIC BLOOD PRESSURE: 55 MMHG

## 2018-12-08 VITALS — DIASTOLIC BLOOD PRESSURE: 54 MMHG | SYSTOLIC BLOOD PRESSURE: 120 MMHG

## 2018-12-08 VITALS — DIASTOLIC BLOOD PRESSURE: 52 MMHG | SYSTOLIC BLOOD PRESSURE: 126 MMHG

## 2018-12-08 VITALS — DIASTOLIC BLOOD PRESSURE: 43 MMHG | SYSTOLIC BLOOD PRESSURE: 115 MMHG

## 2018-12-08 VITALS — SYSTOLIC BLOOD PRESSURE: 120 MMHG | DIASTOLIC BLOOD PRESSURE: 61 MMHG

## 2018-12-08 VITALS — SYSTOLIC BLOOD PRESSURE: 119 MMHG | DIASTOLIC BLOOD PRESSURE: 82 MMHG

## 2018-12-08 RX ADMIN — DIVALPROEX SODIUM SCH MG: 250 TABLET, FILM COATED, EXTENDED RELEASE ORAL at 06:54

## 2018-12-08 RX ADMIN — HYDRALAZINE HYDROCHLORIDE SCH MG: 25 TABLET ORAL at 12:00

## 2018-12-08 RX ADMIN — SODIUM CHLORIDE SCH MLS/HR: 0.9 INJECTION INTRAVENOUS at 21:09

## 2018-12-08 RX ADMIN — DIVALPROEX SODIUM SCH MG: 250 TABLET, FILM COATED, EXTENDED RELEASE ORAL at 14:03

## 2018-12-08 RX ADMIN — HEPARIN SODIUM SCH UNITS: 5000 INJECTION INTRAVENOUS; SUBCUTANEOUS at 21:05

## 2018-12-08 RX ADMIN — MINOXIDIL SCH MG: 2.5 TABLET ORAL at 09:00

## 2018-12-08 RX ADMIN — LABETALOL HCL SCH MG: 200 TABLET, FILM COATED ORAL at 09:00

## 2018-12-08 RX ADMIN — LABETALOL HCL SCH MG: 200 TABLET, FILM COATED ORAL at 21:11

## 2018-12-08 RX ADMIN — MINOXIDIL SCH MG: 2.5 TABLET ORAL at 21:11

## 2018-12-08 RX ADMIN — HEPARIN SODIUM SCH UNITS: 5000 INJECTION INTRAVENOUS; SUBCUTANEOUS at 09:24

## 2018-12-08 RX ADMIN — DIVALPROEX SODIUM SCH MG: 250 TABLET, FILM COATED, EXTENDED RELEASE ORAL at 21:12

## 2018-12-08 RX ADMIN — HYDRALAZINE HYDROCHLORIDE SCH MG: 25 TABLET ORAL at 00:25

## 2018-12-08 RX ADMIN — LEVOTHYROXINE SODIUM SCH MCG: 125 TABLET ORAL at 06:54

## 2018-12-08 RX ADMIN — HYDRALAZINE HYDROCHLORIDE SCH MG: 25 TABLET ORAL at 06:54

## 2018-12-08 RX ADMIN — TAMSULOSIN HYDROCHLORIDE SCH MG: 0.4 CAPSULE ORAL at 21:08

## 2018-12-08 RX ADMIN — HYDRALAZINE HYDROCHLORIDE SCH MG: 25 TABLET ORAL at 17:22

## 2018-12-08 RX ADMIN — LEVETIRACETAM SCH MG: 100 SOLUTION ORAL at 21:09

## 2018-12-08 RX ADMIN — LEVETIRACETAM SCH MG: 100 SOLUTION ORAL at 09:23

## 2018-12-08 NOTE — HISTORY AND PHYSICAL REPORT
DATE OF ADMISSION:  12/07/2018

CHIEF COMPLAINT:  Low hemoglobin.



HISTORY OF PRESENT ILLNESS:  This is an 83-year-old male, who is on

dialysis every Monday, Wednesday, Friday.  The dialysis unit called me

about hemoglobin of 6.9.  I called the patient's primary care physician

Dr. Yael Dean.  The patient needed transfusion on dialysis.  The patient

is admitted for this purpose.  The patient had open reduction and internal

fixation of his left hip last week in another hospital.  This is

apparently the reason for his current severe anemia.



PAST MEDICAL HISTORY:

1. End-stage renal failure on dialysis.

2. Status post recent open reduction and internal fixation of left hip

secondary to fracture.

3. Organic brain syndrome.

4. Status post gastrostomy.

5. Anemia of chronic kidney disease.

6. Benign prostatic hypertrophy.

7. Type 2 diabetes mellitus.

8. Seizure disorder.

9. History of non-STEMI.

10. Hypothyroidism.



MEDICATIONS:  Amlodipine, Depakote, Procrit on dialysis, subcutaneous

heparin, hydralazine, labetalol, Keppra, Synthroid, minoxidil, Protonix,

Flomax, and baby aspirin.



ALLERGIES:  No known drug allergies.



FAMILY HISTORY:  Unable to obtain.  The patient is very confused.



SOCIAL HISTORY:  Unable to obtain.  The patient is very confused.



REVIEW OF SYSTEMS:  Unable to obtain.  The patient is very

confused.



PHYSICAL EXAMINATION:

GENERAL:  This is an elderly  male, who is in no acute

distress.

VITAL SIGNS:  Blood pressure 115/43, pulse 65 and regular, respirations 20,

and temperature 97.1.

HEENT:  The head is normocephalic and atraumatic.  Pupils are equal, round,

and reactive to light and accommodation consensually.

NECK:  Supple.  Trachea midline.  There was no lymphadenopathy or

thyromegaly.

LUNGS:  Clear to auscultation and percussion.

HEART:  Regular rate and rhythm without rubs, murmurs, or gallops.

ABDOMEN:  Soft and nontender.  Bowel sounds were active.

EXTREMITIES:  No clubbing, cyanosis, or edema.  He has left upper arm AV

fistula.

NEUROLOGICAL:  He is confused.  There were no gross focal findings.



LABORATORY AND ANCILLARY DATA:  CBC shows hematocrit 23.4, hemoglobin 7.3.

Serum chemistry, BUN 43, creatinine 6.1, electrolytes within normal

limits.



ASSESSMENT:

1. Severe anemia with recently worsening due to recent orthopedic

surgery.

2. End-stage renal failure on dialysis.

3. Status post recent open reduction and internal fixation of left hip

secondary to fracture.

4. Organic brain syndrome.

5. Status post gastrostomy.

6. Anemia of chronic kidney disease.

7. Benign prostatic hypertrophy.

8. Type 2 diabetes mellitus.

9. Seizure disorder.

10. History of non-STEMI.

11. Hypothyroidism.



PLAN:

1. The patient is receiving hemodialysis now with transfusion.

2. Check another set of CBC tomorrow.  If stable, discharge back to his

nursing home.









  ______________________________________________

  Patricia Holley M.D.





DR:  NAY

D:  12/08/2018 11:03

T:  12/09/2018 02:54

JOB#:  [id number]

CC:

## 2018-12-09 VITALS — SYSTOLIC BLOOD PRESSURE: 120 MMHG | DIASTOLIC BLOOD PRESSURE: 50 MMHG

## 2018-12-09 VITALS — DIASTOLIC BLOOD PRESSURE: 46 MMHG | SYSTOLIC BLOOD PRESSURE: 125 MMHG

## 2018-12-09 VITALS — SYSTOLIC BLOOD PRESSURE: 115 MMHG | DIASTOLIC BLOOD PRESSURE: 49 MMHG

## 2018-12-09 VITALS — DIASTOLIC BLOOD PRESSURE: 50 MMHG | SYSTOLIC BLOOD PRESSURE: 130 MMHG

## 2018-12-09 VITALS — SYSTOLIC BLOOD PRESSURE: 111 MMHG | DIASTOLIC BLOOD PRESSURE: 44 MMHG

## 2018-12-09 VITALS — SYSTOLIC BLOOD PRESSURE: 133 MMHG | DIASTOLIC BLOOD PRESSURE: 46 MMHG

## 2018-12-09 VITALS — DIASTOLIC BLOOD PRESSURE: 86 MMHG | SYSTOLIC BLOOD PRESSURE: 124 MMHG

## 2018-12-09 LAB
ADD MANUAL DIFF: NO
ANION GAP SERPL CALC-SCNC: 9 MMOL/L (ref 5–15)
BASOPHILS NFR BLD AUTO: 0.6 % (ref 0–2)
BUN SERPL-MCNC: 31 MG/DL (ref 7–18)
CALCIUM SERPL-MCNC: 8.8 MG/DL (ref 8.5–10.1)
CHLORIDE SERPL-SCNC: 103 MMOL/L (ref 98–107)
CO2 SERPL-SCNC: 27 MMOL/L (ref 21–32)
CREAT SERPL-MCNC: 5.7 MG/DL (ref 0.55–1.3)
EOSINOPHIL NFR BLD AUTO: 17.7 % (ref 0–3)
ERYTHROCYTE [DISTWIDTH] IN BLOOD BY AUTOMATED COUNT: 13.3 % (ref 11.6–14.8)
HCT VFR BLD CALC: 26.8 % (ref 42–52)
HGB BLD-MCNC: 8.5 G/DL (ref 14.2–18)
LYMPHOCYTES NFR BLD AUTO: 15.8 % (ref 20–45)
MCV RBC AUTO: 84 FL (ref 80–99)
MONOCYTES NFR BLD AUTO: 10.4 % (ref 1–10)
NEUTROPHILS NFR BLD AUTO: 55.6 % (ref 45–75)
PLATELET # BLD: 198 K/UL (ref 150–450)
POTASSIUM SERPL-SCNC: 4.1 MMOL/L (ref 3.5–5.1)
RBC # BLD AUTO: 3.19 M/UL (ref 4.7–6.1)
SODIUM SERPL-SCNC: 139 MMOL/L (ref 136–145)
WBC # BLD AUTO: 8.1 K/UL (ref 4.8–10.8)

## 2018-12-09 RX ADMIN — HEPARIN SODIUM SCH UNITS: 5000 INJECTION INTRAVENOUS; SUBCUTANEOUS at 08:17

## 2018-12-09 RX ADMIN — TAMSULOSIN HYDROCHLORIDE SCH MG: 0.4 CAPSULE ORAL at 21:25

## 2018-12-09 RX ADMIN — LABETALOL HCL SCH MG: 200 TABLET, FILM COATED ORAL at 08:16

## 2018-12-09 RX ADMIN — DIVALPROEX SODIUM SCH MG: 250 TABLET, FILM COATED, EXTENDED RELEASE ORAL at 21:22

## 2018-12-09 RX ADMIN — DIVALPROEX SODIUM SCH MG: 250 TABLET, FILM COATED, EXTENDED RELEASE ORAL at 14:49

## 2018-12-09 RX ADMIN — HEPARIN SODIUM SCH UNITS: 5000 INJECTION INTRAVENOUS; SUBCUTANEOUS at 21:22

## 2018-12-09 RX ADMIN — MINOXIDIL SCH MG: 2.5 TABLET ORAL at 21:26

## 2018-12-09 RX ADMIN — LEVETIRACETAM SCH MG: 100 SOLUTION ORAL at 21:25

## 2018-12-09 RX ADMIN — SODIUM CHLORIDE SCH MLS/HR: 0.9 INJECTION INTRAVENOUS at 21:21

## 2018-12-09 RX ADMIN — HYDRALAZINE HYDROCHLORIDE SCH MG: 25 TABLET ORAL at 12:00

## 2018-12-09 RX ADMIN — DIVALPROEX SODIUM SCH MG: 250 TABLET, FILM COATED, EXTENDED RELEASE ORAL at 06:22

## 2018-12-09 RX ADMIN — HYDRALAZINE HYDROCHLORIDE SCH MG: 25 TABLET ORAL at 17:28

## 2018-12-09 RX ADMIN — HYDRALAZINE HYDROCHLORIDE SCH MG: 25 TABLET ORAL at 06:21

## 2018-12-09 RX ADMIN — LEVOTHYROXINE SODIUM SCH MCG: 125 TABLET ORAL at 06:21

## 2018-12-09 RX ADMIN — HYDRALAZINE HYDROCHLORIDE SCH MG: 25 TABLET ORAL at 00:05

## 2018-12-09 RX ADMIN — LEVETIRACETAM SCH MG: 100 SOLUTION ORAL at 08:16

## 2018-12-09 RX ADMIN — MINOXIDIL SCH MG: 2.5 TABLET ORAL at 08:17

## 2018-12-09 RX ADMIN — LABETALOL HCL SCH MG: 200 TABLET, FILM COATED ORAL at 21:26

## 2018-12-09 NOTE — NEPHROLOGY PROGRESS NOTE
Assessment/Plan


Plan


Had HD yesterday + 1 unit PRBCs transfused.


Hct today 26.8.


DC to [facility]





Subjective


Subjective


No new c/o





Objective


Objective





Last 24 Hour Vital Signs








  Date Time  Temp Pulse Resp B/P (MAP) Pulse Ox O2 Delivery O2 Flow Rate FiO2


 


12/9/18 08:17    125/46    


 


12/9/18 08:16  70  125/46    


 


12/9/18 08:16  70  125/46    


 


12/9/18 07:53 98.8 70 20 125/46 (72) 95   


 


12/9/18 06:21    119/55    


 


12/9/18 04:00  63      


 


12/9/18 04:00 98.4 63 18 130/50 (76) 93   


 


12/9/18 00:05    120/55    


 


12/9/18 00:00  63      


 


12/9/18 00:00 98.3 61 18 120/50 (73) 95   


 


12/8/18 21:11    132/60    


 


12/8/18 21:11  65  132/60    


 


12/8/18 21:00      Room Air  


 


12/8/18 20:00 97.9 67 18 131/55 (80) 98   


 


12/8/18 20:00  67      


 


12/8/18 17:25  70  120/54    


 


12/8/18 17:22    120/54    


 


12/8/18 16:00  67      


 


12/8/18 15:59 97.8 70 20 120/54 (76) 98   


 


12/8/18 12:00    126/52    


 


12/8/18 12:00 98.4 58 20 126/52 (76) 97   


 


12/8/18 11:48  60      

















Intake and Output  


 


 12/8/18 12/9/18





 19:00 07:00


 


Intake Total 420 ml 60 ml


 


Output Total 2000 ml 


 


Balance -1580 ml 60 ml


 


  


 


Intake Oral 420 ml 


 


IV Total  60 ml


 


Hemodialysis UF 2000 ml 


 


# Voids  3








Laboratory Tests


12/9/18 06:55: 


White Blood Count 8.1, Red Blood Count 3.19L, Hemoglobin 8.5L, Hematocrit 26.8L

, Mean Corpuscular Volume 84, Mean Corpuscular Hemoglobin 26.7L, Mean 

Corpuscular Hemoglobin Concent 31.8L, Red Cell Distribution Width 13.3, 

Platelet Count 198, Mean Platelet Volume 7.1, Neutrophils (%) (Auto) 55.6, 

Lymphocytes (%) (Auto) 15.8L, Monocytes (%) (Auto) 10.4H, Eosinophils (%) (Auto

) 17.7H, Basophils (%) (Auto) 0.6, Sodium Level 139, Potassium Level 4.1, 

Chloride Level 103, Carbon Dioxide Level 27, Anion Gap 9, Blood Urea Nitrogen 

31H, Creatinine 5.7H, Estimat Glomerular Filtration Rate , Glucose Level 134H, 

Calcium Level 8.8


Height (Feet):  5


Height (Inches):  8.00


Weight (Pounds):  168


Objective


Confused.


CV RR


Lungs CTA


Abd SNT. BS +


E No CCE. MASOUD SCHRADER.











Patircia Holley MD Dec 9, 2018 09:51

## 2018-12-09 NOTE — CONSULTATION
History of Present Illness


General


Chief Complaint:  Abnormal Labs





Present Illness


HPI


83-year-old male, with hx of dementia with behavioral dist who is my pt at [facility] was admitted for medical stabilization. the pt is confused and unable to 

provide hx. the pt has memory impairment the pt is more confused than baseline.


Allergies:  


Coded Allergies:  


     No Known Allergies (Unverified , 3/20/18)





Medication History


Scheduled


Amlodipine Besylate* (Amlodipine Besylate*), 5 MG PO BID, (Reported)


Aspirin* (Aspir 81*), 81 MG ORAL DAILY, (Reported)


Divalproex Sodium* (Depakote Er*), 250 MG ORAL TID, (Reported)


Hydralazine Hcl* (Hydralazine Hcl*), 25 MG PO QID, (Reported)


Labetalol Hcl* (Normodyne*), 400 MG PO EVERY 12 HOURS, (Reported)


Levetiracetam* (Levetiracetam*), 500 MG PO BID, (Reported)


Levothyroxine Sodium* (Levothyroxine Sodium*), 125 MCG PO DAILY, (Reported)


Minoxidil* (Loniten*), 5 MG PO DAILY, (Reported)


Tamsulosin Hcl (Tamsulosin Hcl*), 0.4 MG PO BEDTIME, (Reported)


Vitamin B Cmplx/Vit C/Folic AC (Nephro-Saadia Tablet), 1 TAB PO DAILY, (Reported)





Patient History


Limited by:  medical condition


History Provided By:  Medical Record, PMD


Healthcare decision maker





Resuscitation status


Full Code


Advanced Directive on File








Past Medical/Surgical History


Past Medical/Surgical History:  


(1) Arthritis


(2) Hypothyroidism


(3) Chronic pancreatitis


(4) ESRD (end stage renal disease)


(5) HTN (hypertension)


(6) Colonoscopy planned


(7) Constipation


(8) Dementia


(9) GERD (gastroesophageal reflux disease)


(10) PEG (percutaneous endoscopic gastrostomy) adjustment/replacement/removal


(11) Anemia





Review of Systems


Psychiatric:  Reports: prior hx, anxiety, depressed feelings, emotional problems





Physical Exam


General Appearance:  alert, confused





Last 24 Hour Vital Signs








  Date Time  Temp Pulse Resp B/P (MAP) Pulse Ox O2 Delivery O2 Flow Rate FiO2


 


12/9/18 21:26    132/53    


 


12/9/18 21:26  71  132/53    


 


12/9/18 21:00 98.4 67 20 133/46 (75) 96   


 


12/9/18 21:00      Room Air  


 


12/9/18 20:00  67      


 


12/9/18 17:28    115/49    


 


12/9/18 17:28  59  115/49    


 


12/9/18 16:00 97.9 59 18 115/49 (71) 96   


 


12/9/18 15:44  61      


 


12/9/18 12:00    111/44    


 


12/9/18 12:00 98.6 20 20 111/44 (66) 94   


 


12/9/18 11:52  66      


 


12/9/18 08:17    125/46    


 


12/9/18 08:16  70  125/46    


 


12/9/18 08:16  70  125/46    


 


12/9/18 08:00      Room Air  


 


12/9/18 07:53 98.8 70 20 125/46 (72) 95   


 


12/9/18 07:49  68      


 


12/9/18 06:21    119/55    


 


12/9/18 04:00  63      


 


12/9/18 04:00 98.4 63 18 130/50 (76) 93   


 


12/9/18 00:05    120/55    


 


12/9/18 00:00  63      


 


12/9/18 00:00 98.3 61 18 120/50 (73) 95   

















Intake and Output  


 


 12/8/18 12/9/18





 19:00 07:00


 


Intake Total 420 ml 60 ml


 


Output Total 2000 ml 


 


Balance -1580 ml 60 ml


 


  


 


Intake Oral 420 ml 


 


IV Total  60 ml


 


Hemodialysis UF 2000 ml 


 


# Voids  3











Laboratory Tests








Test


  12/9/18


06:55


 


White Blood Count


  8.1 K/UL


(4.8-10.8)


 


Red Blood Count


  3.19 M/UL


(4.70-6.10)  L


 


Hemoglobin


  8.5 G/DL


(14.2-18.0)  L


 


Hematocrit


  26.8 %


(42.0-52.0)  L


 


Mean Corpuscular Volume 84 FL (80-99)  


 


Mean Corpuscular Hemoglobin


  26.7 PG


(27.0-31.0)  L


 


Mean Corpuscular Hemoglobin


Concent 31.8 G/DL


(32.0-36.0)  L


 


Red Cell Distribution Width


  13.3 %


(11.6-14.8)


 


Platelet Count


  198 K/UL


(150-450)


 


Mean Platelet Volume


  7.1 FL


(6.5-10.1)


 


Neutrophils (%) (Auto)


  55.6 %


(45.0-75.0)


 


Lymphocytes (%) (Auto)


  15.8 %


(20.0-45.0)  L


 


Monocytes (%) (Auto)


  10.4 %


(1.0-10.0)  H


 


Eosinophils (%) (Auto)


  17.7 %


(0.0-3.0)  H


 


Basophils (%) (Auto)


  0.6 %


(0.0-2.0)


 


Sodium Level


  139 MMOL/L


(136-145)


 


Potassium Level


  4.1 MMOL/L


(3.5-5.1)


 


Chloride Level


  103 MMOL/L


()


 


Carbon Dioxide Level


  27 MMOL/L


(21-32)


 


Anion Gap


  9 mmol/L


(5-15)


 


Blood Urea Nitrogen


  31 mg/dL


(7-18)  H


 


Creatinine


  5.7 MG/DL


(0.55-1.30)  H


 


Estimat Glomerular Filtration


Rate  mL/min (>60)  


 


 


Glucose Level


  134 MG/DL


()  H


 


Calcium Level


  8.8 MG/DL


(8.5-10.1)








Height (Feet):  5


Height (Inches):  8.00


Weight (Pounds):  168


Medications





Current Medications








 Medications


  (Trade)  Dose


 Ordered  Sig/Simon


 Route


 PRN Reason  Start Time


 Stop Time Status Last Admin


Dose Admin


 


 Amlodipine


 Besylate


  (Norvasc)  5 mg  BID


 ORAL


   12/8/18 09:00


 1/7/19 08:59  12/9/18 08:16


 


 


 Divalproex Sodium


  (Depakote ER)  250 mg  Q8HR


 ORAL


   12/8/18 06:00


 1/7/19 05:59  12/9/18 21:22


 


 


 Epoetin Samuel


  (Procrit (for


 ESRD on dialysis))  10,000 units  MON-WED-FRI


 SUBQ


   12/7/18 21:00


 1/6/19 20:59  12/7/18 20:51


 


 


 Heparin Sodium


  (Porcine)


  (Heparin 5000


 units/ml)  5,000 units  EVERY 12  HOURS


 SUBQ


   12/7/18 21:00


 1/6/19 20:59  12/9/18 21:22


 


 


 Heparin Sodium


  (Porcine)


  (Heparin Sod


 1000 units/ml


 10ml)  2,000 unit  ONCE


 IV


   12/10/18 06:00


 12/10/18 18:00   


 


 


 Hydralazine HCl


  (Apresoline)  25 mg  Q6HR


 ORAL


   12/8/18 00:00


 1/7/19 00:00  12/9/18 06:21


 


 


 Iron Sucrose 100


 mg/Sodium Chloride  60 ml @ 


 240 mls/hr  BEDTIME


 IV


   12/7/18 21:00


 12/11/18 21:14  12/9/18 21:21


 


 


 Labetalol HCl


  (Normodyne)  400 mg  EVERY 12  HOURS


 ORAL


   12/7/18 21:00


 1/6/19 20:59  12/9/18 21:26


 


 


 Levetiracetam


  (Keppra)  500 mg  Q12HR


 ORAL


   12/7/18 21:00


 1/6/19 20:59  12/9/18 21:25


 


 


 Levothyroxine


 Sodium


  (Synthroid)  125 mcg  ACBREAKFAST


 ORAL


   12/8/18 06:30


 1/7/19 06:29  12/9/18 06:21


 


 


 Minoxidil


  (Loniten)  5 mg  Q12HR


 ORAL


   12/7/18 21:00


 1/6/19 20:59  12/9/18 21:26


 


 


 Pantoprazole


  (Protonix)  40 mg  DAILY


 ORAL


   12/8/18 09:00


 1/7/19 08:59  12/9/18 08:16


 


 


 Sodium Chloride  1,000 ml @ 


 500 mls/hr  Q2H PRN


 IVLG


 sbp<90 during hd  12/10/18 06:00


 12/10/18 18:00   


 


 


 Tamsulosin HCl


  (Flomax)  0.4 mg  BEDTIME


 ORAL


   12/7/18 21:00


 1/6/19 20:59  12/9/18 21:25


 











Assessment/Plan


Problem List:  


(1) Dementia


ICD Codes:  F03.90 - Unspecified dementia without behavioral disturbance


SNOMED:  00439318


Assessment/Plan


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





depakote 250mg tid


provided ro/Angela Osorio MD Dec 9, 2018 22:45

## 2018-12-10 VITALS — DIASTOLIC BLOOD PRESSURE: 88 MMHG | SYSTOLIC BLOOD PRESSURE: 118 MMHG

## 2018-12-10 VITALS — DIASTOLIC BLOOD PRESSURE: 46 MMHG | SYSTOLIC BLOOD PRESSURE: 128 MMHG

## 2018-12-10 VITALS — DIASTOLIC BLOOD PRESSURE: 55 MMHG | SYSTOLIC BLOOD PRESSURE: 129 MMHG

## 2018-12-10 VITALS — SYSTOLIC BLOOD PRESSURE: 126 MMHG | DIASTOLIC BLOOD PRESSURE: 88 MMHG

## 2018-12-10 LAB
ADD MANUAL DIFF: NO
ANION GAP SERPL CALC-SCNC: 11 MMOL/L (ref 5–15)
BASOPHILS NFR BLD AUTO: 0.7 % (ref 0–2)
BUN SERPL-MCNC: 39 MG/DL (ref 7–18)
CALCIUM SERPL-MCNC: 8.9 MG/DL (ref 8.5–10.1)
CHLORIDE SERPL-SCNC: 104 MMOL/L (ref 98–107)
CO2 SERPL-SCNC: 25 MMOL/L (ref 21–32)
CREAT SERPL-MCNC: 6.6 MG/DL (ref 0.55–1.3)
EOSINOPHIL NFR BLD AUTO: 16.5 % (ref 0–3)
ERYTHROCYTE [DISTWIDTH] IN BLOOD BY AUTOMATED COUNT: 13.3 % (ref 11.6–14.8)
HCT VFR BLD CALC: 27.5 % (ref 42–52)
HGB BLD-MCNC: 8.7 G/DL (ref 14.2–18)
LYMPHOCYTES NFR BLD AUTO: 17.6 % (ref 20–45)
MCV RBC AUTO: 85 FL (ref 80–99)
MONOCYTES NFR BLD AUTO: 9.9 % (ref 1–10)
NEUTROPHILS NFR BLD AUTO: 55.4 % (ref 45–75)
PHOSPHATE SERPL-MCNC: 4.6 MG/DL (ref 2.5–4.9)
PLATELET # BLD: 204 K/UL (ref 150–450)
POTASSIUM SERPL-SCNC: 4.2 MMOL/L (ref 3.5–5.1)
RBC # BLD AUTO: 3.26 M/UL (ref 4.7–6.1)
SODIUM SERPL-SCNC: 140 MMOL/L (ref 136–145)
WBC # BLD AUTO: 7.5 K/UL (ref 4.8–10.8)

## 2018-12-10 PROCEDURE — 5A1D70Z PERFORMANCE OF URINARY FILTRATION, INTERMITTENT, LESS THAN 6 HOURS PER DAY: ICD-10-PCS

## 2018-12-10 RX ADMIN — DIVALPROEX SODIUM SCH MG: 250 TABLET, FILM COATED, EXTENDED RELEASE ORAL at 15:05

## 2018-12-10 RX ADMIN — HYDRALAZINE HYDROCHLORIDE SCH MG: 25 TABLET ORAL at 00:15

## 2018-12-10 RX ADMIN — DIVALPROEX SODIUM SCH MG: 250 TABLET, FILM COATED, EXTENDED RELEASE ORAL at 06:06

## 2018-12-10 RX ADMIN — HYDRALAZINE HYDROCHLORIDE SCH MG: 25 TABLET ORAL at 12:00

## 2018-12-10 RX ADMIN — HYDRALAZINE HYDROCHLORIDE SCH MG: 25 TABLET ORAL at 06:00

## 2018-12-10 NOTE — NEPHROLOGY PROGRESS NOTE
Assessment/Plan


Plan


Had HD yesterday + 1 unit PRBCs transfused.


Hct today 26.8.


DC to [facility]


DC held due to SNF inabilty to accept patient. Awaiting HD. HCt 27.5





Subjective


Subjective


No new c/o





Objective


Objective





Last 24 Hour Vital Signs








  Date Time  Temp Pulse Resp B/P (MAP) Pulse Ox O2 Delivery O2 Flow Rate FiO2


 


12/10/18 06:00    118/88    


 


12/10/18 04:00 98.9 71 18 118/88 (98) 99   


 


12/10/18 00:15    126/88    


 


12/10/18 00:00 98.4 65 18 126/88 (101) 100   


 


12/9/18 22:55 98.4 62 20 124/86 (99) 100   


 


12/9/18 21:26    132/53    


 


12/9/18 21:26  71  132/53    


 


12/9/18 21:00 98.4 67 20 133/46 (75) 96   


 


12/9/18 21:00      Room Air  


 


12/9/18 20:00  67      


 


12/9/18 17:28    115/49    


 


12/9/18 17:28  59  115/49    


 


12/9/18 16:00 97.9 59 18 115/49 (71) 96   


 


12/9/18 15:44  61      


 


12/9/18 12:00    111/44    


 


12/9/18 12:00 98.6 20 20 111/44 (66) 94   


 


12/9/18 11:52  66      


 


12/9/18 08:17    125/46    


 


12/9/18 08:16  70  125/46    


 


12/9/18 08:16  70  125/46    

















Intake and Output  


 


 12/9/18 12/10/18





 19:00 07:00


 


Intake Total 195 ml 50 ml


 


Balance 195 ml 50 ml


 


  


 


Intake Oral 195 ml 50 ml


 


# Voids 1 


 


# Bowel Movements  2








Laboratory Tests


12/10/18 05:30: 


White Blood Count 7.5, Red Blood Count 3.26L, Hemoglobin 8.7L, Hematocrit 27.5L

, Mean Corpuscular Volume 85, Mean Corpuscular Hemoglobin 26.7L, Mean 

Corpuscular Hemoglobin Concent 31.6L, Red Cell Distribution Width 13.3, 

Platelet Count 204, Mean Platelet Volume 7.0, Neutrophils (%) (Auto) 55.4, 

Lymphocytes (%) (Auto) 17.6L, Monocytes (%) (Auto) 9.9, Eosinophils (%) (Auto) 

16.5H, Basophils (%) (Auto) 0.7, Sodium Level 140, Potassium Level 4.2, 

Chloride Level 104, Carbon Dioxide Level 25, Anion Gap 11, Blood Urea Nitrogen 

39H, Creatinine 6.6H, Estimat Glomerular Filtration Rate , Glucose Level 129H, 

Calcium Level 8.9, Phosphorus Level 4.6


Height (Feet):  5


Height (Inches):  8.00


Weight (Pounds):  167


Objective


Confused.


CV RR


Lungs CTA


Abd SNT. BS +


E No CCE. MASOUD SCHRADER.











Patricia Holley MD Dec 10, 2018 08:04

## 2018-12-10 NOTE — GENERAL PROGRESS NOTE
Assessment/Plan


Problem List:  


(1) Dementia


ICD Codes:  F03.90 - Unspecified dementia without behavioral disturbance


SNOMED:  32046343


Assessment/Plan


encephalopathy





cont depakote


provided ro/st





Subjective


Neurologic/Psychiatric:  Reports: anxiety, depressed


Allergies:  


Coded Allergies:  


     No Known Allergies (Unverified , 3/20/18)





Objective





Last 24 Hour Vital Signs








  Date Time  Temp Pulse Resp B/P (MAP) Pulse Ox O2 Delivery O2 Flow Rate FiO2


 


12/10/18 12:00 97.8 63 18 129/55 (79) 97   


 


12/10/18 12:00    128/46    


 


12/10/18 09:00      Room Air  


 


12/10/18 09:00    128/46    


 


12/10/18 09:00  72  128/46    


 


12/10/18 09:00  72  128/46    


 


12/10/18 08:00 97.8 72 18 128/46 (73) 98   


 


12/10/18 06:00    118/88    


 


12/10/18 04:00 98.9 71 18 118/88 (98) 99   


 


12/10/18 00:15    126/88    


 


12/10/18 00:00 98.4 65 18 126/88 (101) 100   


 


12/9/18 22:55 98.4 62 20 124/86 (99) 100   


 


12/9/18 21:26    132/53    


 


12/9/18 21:26  71  132/53    


 


12/9/18 21:00 98.4 67 20 133/46 (75) 96   


 


12/9/18 21:00      Room Air  


 


12/9/18 20:00  67      


 


12/9/18 17:28    115/49    


 


12/9/18 17:28  59  115/49    


 


12/9/18 16:00 97.9 59 18 115/49 (71) 96   


 


12/9/18 15:44  61      

















Intake and Output  


 


 12/9/18 12/10/18





 19:00 07:00


 


Intake Total 195 ml 50 ml


 


Balance 195 ml 50 ml


 


  


 


Intake Oral 195 ml 50 ml


 


# Voids 1 


 


# Bowel Movements  2








Laboratory Tests


12/10/18 05:30: 


White Blood Count 7.5, Red Blood Count 3.26L, Hemoglobin 8.7L, Hematocrit 27.5L

, Mean Corpuscular Volume 85, Mean Corpuscular Hemoglobin 26.7L, Mean 

Corpuscular Hemoglobin Concent 31.6L, Red Cell Distribution Width 13.3, 

Platelet Count 204, Mean Platelet Volume 7.0, Neutrophils (%) (Auto) 55.4, 

Lymphocytes (%) (Auto) 17.6L, Monocytes (%) (Auto) 9.9, Eosinophils (%) (Auto) 

16.5H, Basophils (%) (Auto) 0.7, Sodium Level 140, Potassium Level 4.2, 

Chloride Level 104, Carbon Dioxide Level 25, Anion Gap 11, Blood Urea Nitrogen 

39H, Creatinine 6.6H, Estimat Glomerular Filtration Rate , Glucose Level 129H, 

Calcium Level 8.9, Phosphorus Level 4.6


Height (Feet):  5


Height (Inches):  8.00


Weight (Pounds):  167


General Appearance:  no apparent distress, alert, confused











Angela Lambert MD Dec 10, 2018 14:15

## 2018-12-11 NOTE — DISCHARGE SUMMARY
Discharge Summary


Discharge Summary


_


DATE OF ADMISSION:  12/7/2018 





DATE OF DISCHARGE: 12/10/2018





REASON FOR ADMISSION: 


83 years old male with past medical history of end-stage renal failure,  on 

hemodialysis, organic brain syndrome, anemia of chronic kidney disease, benign 

prostatic hypertrophy, type 2 diabetes mellitus, seizure disorder, history of 

non-STEMI, hypothyroidism, status post recent open reduction internal fixation 

left hip secondary to fracture ,status post gastrostomy, presented with 

evidence of anemia.  


At outpatient hemodialysis unit hemoglobin  was 6.9.  Patient required 

transfusion on dialysis and subsequently was [place] [facility] [place] 

[place]. 


Laboratory workup revealed hemoglobin 7.3 ,hematocrit 23.4 with MCV of 84.


BUN 43 ,creatinine 6.1, consistent with known history of end-stage renal 

disease.  


Patient admitted with diagnoses of severe anemia, recently worsened due to 

orthopedic surgery, end-stage renal disease on hemodialysis,anemia of chronic 

kidney disease,


status post recent open reduction internal fixation of left hip secondary to 

fracture, history of non-STEMI, hypothyroidism, 


organic brain syndrome, status post gastrostomy ,  benign prostatic hypertrophy

, type 2 diabetes mellitus, seizure disorder,


 


CONSULTANTS:


psychiatrist 


 


HOSPITAL COURSE: 


Patient admitted to medical surgical floor.  


Hemodialysis was arranged.  


Patient received blood transfusion with hemodialysis


Patient undergone transfusion of total of 2 units of packed red blood cells.  


Prior to discharge hemoglobin 8.7 hematocrit 27.5.


Patient was on Epogen 10,000 units 3 times week.  


Patient was given  IV Venofer.  


Blood pressure was managed with multiple regimen of antihypertensive, including 

calcium channel blocker ,  beta-blocker , Hydralazine  and Minoxidil .  


Flomax continued.  


DVT and GI prophylaxis provided.  


Seizure precaution maintained.  Keppra and Depakote continued.  


Levothyroxine continued


Blood sugar  was monitored and remained stable. 


Patient stabilized and subsequently transferred to skilled nursing facility for 

continuation of care





FINAL DIAGNOSES: 


Severe anemia ,recently worsened due to recent orthopedic surgery


End-stage renal failure, on hemodialysis


Anemia of chronic kidney disease


Status post recent open reduction internal fixation of left hip secondary to 

hip fracture


Type 2 diabetes mellitus


Benign prostatic hypertrophy


Seizure disorder


History of  NSTEMI


Hypothyroidism


Organic brain syndrome


Status post gastrostomy





DISCHARGE MEDICATIONS:


See Medication Reconciliation list.





DISCHARGE INSTRUCTIONS:


Patient was discharged to the skilled nursing facility. 


Follow up with medical doctor at the facility.


Follow-up with outpatient hemodialysis as scheduled.


I have been assigned to dictate discharge summary for this account. I was not 

involved in the patient's management.











Ariana Lozano NP Dec 11, 2018 11:59

## 2019-01-23 ENCOUNTER — HOSPITAL ENCOUNTER (INPATIENT)
Dept: HOSPITAL 72 - EMR | Age: 84
LOS: 6 days | Discharge: NURSING, REHAB OR LONG-TERM CARE | DRG: 640 | End: 2019-01-29
Payer: MEDICARE

## 2019-01-23 VITALS — WEIGHT: 153 LBS | BODY MASS INDEX: 23.19 KG/M2 | HEIGHT: 68 IN

## 2019-01-23 VITALS — SYSTOLIC BLOOD PRESSURE: 151 MMHG | DIASTOLIC BLOOD PRESSURE: 74 MMHG

## 2019-01-23 VITALS — DIASTOLIC BLOOD PRESSURE: 62 MMHG | SYSTOLIC BLOOD PRESSURE: 122 MMHG

## 2019-01-23 VITALS — SYSTOLIC BLOOD PRESSURE: 157 MMHG | DIASTOLIC BLOOD PRESSURE: 64 MMHG

## 2019-01-23 DIAGNOSIS — M19.90: ICD-10-CM

## 2019-01-23 DIAGNOSIS — K21.9: ICD-10-CM

## 2019-01-23 DIAGNOSIS — N18.6: ICD-10-CM

## 2019-01-23 DIAGNOSIS — R13.10: ICD-10-CM

## 2019-01-23 DIAGNOSIS — R62.7: ICD-10-CM

## 2019-01-23 DIAGNOSIS — E86.0: ICD-10-CM

## 2019-01-23 DIAGNOSIS — F09: ICD-10-CM

## 2019-01-23 DIAGNOSIS — F03.91: ICD-10-CM

## 2019-01-23 DIAGNOSIS — Z99.2: ICD-10-CM

## 2019-01-23 DIAGNOSIS — K59.00: ICD-10-CM

## 2019-01-23 DIAGNOSIS — E43: Primary | ICD-10-CM

## 2019-01-23 DIAGNOSIS — L89.610: ICD-10-CM

## 2019-01-23 DIAGNOSIS — I13.11: ICD-10-CM

## 2019-01-23 DIAGNOSIS — E11.22: ICD-10-CM

## 2019-01-23 DIAGNOSIS — B95.62: ICD-10-CM

## 2019-01-23 DIAGNOSIS — G40.909: ICD-10-CM

## 2019-01-23 LAB
ADD MANUAL DIFF: NO
ALBUMIN SERPL-MCNC: 2.8 G/DL (ref 3.4–5)
ALBUMIN/GLOB SERPL: 0.5 {RATIO} (ref 1–2.7)
ALP SERPL-CCNC: 207 U/L (ref 46–116)
ALT SERPL-CCNC: 15 U/L (ref 12–78)
ANION GAP SERPL CALC-SCNC: 7 MMOL/L (ref 5–15)
APTT BLD: 34 SEC (ref 23–33)
AST SERPL-CCNC: 27 U/L (ref 15–37)
BASOPHILS NFR BLD AUTO: 2 % (ref 0–2)
BILIRUB SERPL-MCNC: 0.4 MG/DL (ref 0.2–1)
BUN SERPL-MCNC: 11 MG/DL (ref 7–18)
CALCIUM SERPL-MCNC: 9.7 MG/DL (ref 8.5–10.1)
CHLORIDE SERPL-SCNC: 105 MMOL/L (ref 98–107)
CO2 SERPL-SCNC: 30 MMOL/L (ref 21–32)
CREAT SERPL-MCNC: 3 MG/DL (ref 0.55–1.3)
EOSINOPHIL NFR BLD AUTO: 15 % (ref 0–3)
ERYTHROCYTE [DISTWIDTH] IN BLOOD BY AUTOMATED COUNT: 20 % (ref 11.6–14.8)
GLOBULIN SER-MCNC: 6.2 G/DL
HCT VFR BLD CALC: 54.3 % (ref 42–52)
HGB BLD-MCNC: 15.9 G/DL (ref 14.2–18)
INR PPP: 1 (ref 0.9–1.1)
LYMPHOCYTES NFR BLD AUTO: 14.7 % (ref 20–45)
MCV RBC AUTO: 92 FL (ref 80–99)
MONOCYTES NFR BLD AUTO: 8.5 % (ref 1–10)
NEUTROPHILS NFR BLD AUTO: 59.8 % (ref 45–75)
PLATELET # BLD: 164 K/UL (ref 150–450)
POTASSIUM SERPL-SCNC: 4.4 MMOL/L (ref 3.5–5.1)
RBC # BLD AUTO: 5.9 M/UL (ref 4.7–6.1)
SODIUM SERPL-SCNC: 142 MMOL/L (ref 136–145)
WBC # BLD AUTO: 5.6 K/UL (ref 4.8–10.8)

## 2019-01-23 PROCEDURE — 80329 ANALGESICS NON-OPIOID 1 OR 2: CPT

## 2019-01-23 PROCEDURE — 80048 BASIC METABOLIC PNL TOTAL CA: CPT

## 2019-01-23 PROCEDURE — 84443 ASSAY THYROID STIM HORMONE: CPT

## 2019-01-23 PROCEDURE — 87205 SMEAR GRAM STAIN: CPT

## 2019-01-23 PROCEDURE — 86900 BLOOD TYPING SEROLOGIC ABO: CPT

## 2019-01-23 PROCEDURE — 87181 SC STD AGAR DILUTION PER AGT: CPT

## 2019-01-23 PROCEDURE — 85610 PROTHROMBIN TIME: CPT

## 2019-01-23 PROCEDURE — 86140 C-REACTIVE PROTEIN: CPT

## 2019-01-23 PROCEDURE — 80299 QUANTITATIVE ASSAY DRUG: CPT

## 2019-01-23 PROCEDURE — 36415 COLL VENOUS BLD VENIPUNCTURE: CPT

## 2019-01-23 PROCEDURE — 94003 VENT MGMT INPAT SUBQ DAY: CPT

## 2019-01-23 PROCEDURE — 85730 THROMBOPLASTIN TIME PARTIAL: CPT

## 2019-01-23 PROCEDURE — 87081 CULTURE SCREEN ONLY: CPT

## 2019-01-23 PROCEDURE — 93925 LOWER EXTREMITY STUDY: CPT

## 2019-01-23 PROCEDURE — 93005 ELECTROCARDIOGRAM TRACING: CPT

## 2019-01-23 PROCEDURE — 80053 COMPREHEN METABOLIC PANEL: CPT

## 2019-01-23 PROCEDURE — 94150 VITAL CAPACITY TEST: CPT

## 2019-01-23 PROCEDURE — 86850 RBC ANTIBODY SCREEN: CPT

## 2019-01-23 PROCEDURE — 85651 RBC SED RATE NONAUTOMATED: CPT

## 2019-01-23 PROCEDURE — 99285 EMERGENCY DEPT VISIT HI MDM: CPT

## 2019-01-23 PROCEDURE — 87070 CULTURE OTHR SPECIMN AEROBIC: CPT

## 2019-01-23 PROCEDURE — 86901 BLOOD TYPING SEROLOGIC RH(D): CPT

## 2019-01-23 PROCEDURE — 85025 COMPLETE CBC W/AUTO DIFF WBC: CPT

## 2019-01-23 PROCEDURE — 84484 ASSAY OF TROPONIN QUANT: CPT

## 2019-01-23 RX ADMIN — HYDRALAZINE HYDROCHLORIDE SCH MG: 25 TABLET ORAL at 23:00

## 2019-01-23 RX ADMIN — TAMSULOSIN HYDROCHLORIDE SCH MG: 0.4 CAPSULE ORAL at 23:00

## 2019-01-23 NOTE — EMERGENCY ROOM REPORT
History of Present Illness


General


Chief Complaint:  General Complaint


Source:  Medical Record





Present Illness


HPI


Patient is an 83-year-old male sent in by facility for generalized weakness and 

failure to thrive.  Patient had prior history of end-stage renal disease.  

Patient is currently on dialysis.  He was noted to have decreased oral 

intake.He reportedly had been dialyzed earlier in the day.


Allergies:  


Coded Allergies:  


     No Known Allergies (Unverified , 3/20/18)





Patient History


Past Medical History:  see triage record


Reviewed Nursing Documentation:  PMH: Agreed; PSxH: Agreed





Nursing Documentation-PMH


Past Medical History:  No History, Except For


Hx Cardiac Problems:  Yes - hypothyroism, anemia, HF


Hx Hypertension:  Yes


Hx COPD:  Yes


Hx Diabetes:  Yes - type II


Hx Cancer:  No


Hx Gastrointestinal Problems:  Yes - dysphagia, g-tube, GERD


Hx Dialysis:  Yes - [facility]


Hx Neurological Problems:  Yes - s/p left hip fx on 11/18, muscle weakness


Hx Cerebrovascular Accident:  Yes


Hx Transient Ischemic Attacks:  Yes


Hx Dementia:  Yes


Hx Seizures:  Yes





Review of Systems


All Other Systems:  negative except mentioned in HPI





Physical Exam





Vital Signs








  Date Time  Temp Pulse Resp B/P (MAP) Pulse Ox O2 Delivery O2 Flow Rate FiO2


 


1/23/19 15:38 98.4 70 18 122/62 98 Room Air  








Sp02 EP Interpretation:  reviewed, normal


General Appearance:  normal inspection, alert, Chronically Ill


Head:  atraumatic


ENT:  normal ENT inspection, hearing grossly normal, normal voice


Neck:  normal inspection, full range of motion, supple, no bony tend


Respiratory:  normal inspection, lungs clear, normal breath sounds, no 

respiratory distress, no retraction, no wheezing


Cardiovascular #1:  regular rate, rhythm, no edema


Gastrointestinal:  normal inspection, normal bowel sounds, non tender, soft, no 

guarding, no hernia


Genitourinary:  no CVA tenderness


Musculoskeletal:  normal inspection, back normal, normal range of motion


Neurologic:  normal inspection, alert, responsive, speech normal


Psychiatric:  normal inspection, judgement/insight normal, mood/affect normal


Skin:  normal inspection, normal color, no rash





Medical Decision Making


Diagnostic Impression:  


 Primary Impression:  


 Weakness


 Additional Impressions:  


 ESRD (end stage renal disease)


 Dehydration


ER Course


Patient presented for generalized weakness.  Differential diagnosis included 

was not limited to anemia, urinary tract infection, electrolyte abnormality, 

hypothyroidism, myocardial infarction, myasthenia gravis, dehydration, among 

others.  Because of complexity of patient's case laboratory testing and imaging 

studies were ordered.  Patient is noted to be poor historian due to dementia.  

He appears to be generalized weak.  Per nursing home staff patient has had 

decreased oral intake.  Patient be admitted for further evaluation and 

treatment.  Dr. Pradhan was contacted for inpatient management due to 

covering physician.





Labs








Test


  1/23/19


17:28


 


White Blood Count


  5.6 K/UL


(4.8-10.8)


 


Red Blood Count


  5.90 M/UL


(4.70-6.10)


 


Hemoglobin


  15.9 G/DL


(14.2-18.0)


 


Hematocrit


  54.3 %


(42.0-52.0)


 


Mean Corpuscular Volume 92 FL (80-99) 


 


Mean Corpuscular Hemoglobin


  27.0 PG


(27.0-31.0)


 


Mean Corpuscular Hemoglobin


Concent 29.3 G/DL


(32.0-36.0)


 


Red Cell Distribution Width


  20.0 %


(11.6-14.8)


 


Platelet Count


  164 K/UL


(150-450)


 


Mean Platelet Volume


  7.8 FL


(6.5-10.1)


 


Neutrophils (%) (Auto)


  59.8 %


(45.0-75.0)


 


Lymphocytes (%) (Auto)


  14.7 %


(20.0-45.0)


 


Monocytes (%) (Auto)


  8.5 %


(1.0-10.0)


 


Eosinophils (%) (Auto)


  15.0 %


(0.0-3.0)


 


Basophils (%) (Auto)


  2.0 %


(0.0-2.0)


 


Prothrombin Time


  10.4 SEC


(9.30-11.50)


 


Prothromb Time International


Ratio 1.0 (0.9-1.1) 


 


 


Activated Partial


Thromboplast Time 34 SEC (23-33) 


 


 


Sodium Level


  142 MMOL/L


(136-145)


 


Potassium Level


  4.4 MMOL/L


(3.5-5.1)


 


Chloride Level


  105 MMOL/L


()


 


Carbon Dioxide Level


  30 MMOL/L


(21-32)


 


Anion Gap


  7 mmol/L


(5-15)


 


Blood Urea Nitrogen


  11 mg/dL


(7-18)


 


Creatinine


  3.0 MG/DL


(0.55-1.30)


 


Estimat Glomerular Filtration


Rate  mL/min (>60) 


 


 


Glucose Level


  120 MG/DL


()


 


Calcium Level


  9.7 MG/DL


(8.5-10.1)


 


Total Bilirubin


  0.4 MG/DL


(0.2-1.0)


 


Aspartate Amino Transf


(AST/SGOT) 27 U/L (15-37) 


 


 


Alanine Aminotransferase


(ALT/SGPT) 15 U/L (12-78) 


 


 


Alkaline Phosphatase


  207 U/L


()


 


Troponin I


  0.008 ng/mL


(0.000-0.056)


 


Total Protein


  9.0 G/DL


(6.4-8.2)


 


Albumin


  2.8 G/DL


(3.4-5.0)


 


Globulin 6.2 g/dL 


 


Albumin/Globulin Ratio 0.5 (1.0-2.7) 


 


Thyroid Stimulating Hormone


(TSH) 5.673 uiU/mL


(0.358-3.740)


 


Serum Alcohol < 3 mg/dL 








EKG Diagnostic Results


Rate:  normal


Rhythm:  NSR


ST Segments:  no acute changes


ASA given to the pt in ED:  No





Last Vital Signs








  Date Time  Temp Pulse Resp B/P (MAP) Pulse Ox O2 Delivery O2 Flow Rate FiO2


 


1/23/19 15:38 98.4 70 18 122/62 98 Room Air  








Status:  unchanged


Disposition:  ADMITTED AS INPATIENT


Condition:  Stable











Moreno De La Rosa MD Jan 23, 2019 16:38

## 2019-01-23 NOTE — NUR
ED Nurse Note:



PT BEING AMDITTED TO FLOOR UNIT, REPORT CALELD TO MARCIN BARRERA TO RESUME CARE, PT IN 
BED AWAKE AND ALERT, IV SITE INTACT AND PATENT, BELONGINGS LIST COMPLETED, NO 
S/S OF PAIN OR ANY DISTRESS, PT BEING TAKEN TO FLOOR UNIT BY RN AND ER-TECH VIA 
ARELIS AND ACLS PROTOCOLS, NAD NOTED DURING PT TRANSPORT TO UNIT.

## 2019-01-23 NOTE — NUR
NURSE NOTES:

Paged  for orders.  called back right away and gave me orders. Orders placed. 
Continue to monitor.

## 2019-01-23 NOTE — NUR
NURSE NOTES:

Got report from Yesenia BURCH from ER on phone prior to pt being brought up to unit. Initial 
assessment done. Pt denies any pain or discomfort. Pt has DTI bilateral heels and had that 
prior to being brought up here on unit. Pt came in with DTI bilateral heels from [facility]. Pictures of DTI bilateral heels taken in ER. Swabs done (CRE,VRE,MRSA,Wound) 
when pt came onto unit. V/S: BP:157/64 T:97.5 R:20 O2:100% on room air HR:64. Continue to 
monitor.

## 2019-01-23 NOTE — NUR
ED Nurse Note:



Pt BIBA from dialysis center for G-tube placement and failure to thrive. Pt AAO 
x1 and refusing chaning to gown and being connected to monitor. Will try again.

## 2019-01-23 NOTE — NUR
ED Nurse Note:



Attempted to give report to Telemetry unit. Per charge nurse, admitting nurse, 
MARCIN Martin will be available in 20 minutes. will endorse to next shift nurse.

## 2019-01-23 NOTE — NUR
HAND-OFF: 

Report given to MARCIN Patterson. Pt is ready to be transferred and attempting to 
giving report was not available. marcin Martin will receive report by 7:30pm and 
it was endorsed.

## 2019-01-23 NOTE — NUR
ED Nurse Note:



Convinced patient and pt agreed to change to gown and being connected to 
monitor. Pressure ulcer noted on both heels and skin dark and very dry. Shunt 
present on Lt upper arm covered by dressing. Denied pain or other discomfort. 
Calm and cooperative in bed.

## 2019-01-23 NOTE — NUR
ED Nurse Note:



Per Dr. De La Rosa, no urine needed due to pt is on dialysis. will attempt to give 
report to transfer pt.

## 2019-01-24 VITALS — SYSTOLIC BLOOD PRESSURE: 150 MMHG | DIASTOLIC BLOOD PRESSURE: 54 MMHG

## 2019-01-24 VITALS — SYSTOLIC BLOOD PRESSURE: 143 MMHG | DIASTOLIC BLOOD PRESSURE: 50 MMHG

## 2019-01-24 VITALS — DIASTOLIC BLOOD PRESSURE: 47 MMHG | SYSTOLIC BLOOD PRESSURE: 131 MMHG

## 2019-01-24 VITALS — SYSTOLIC BLOOD PRESSURE: 142 MMHG | DIASTOLIC BLOOD PRESSURE: 60 MMHG

## 2019-01-24 VITALS — DIASTOLIC BLOOD PRESSURE: 75 MMHG | SYSTOLIC BLOOD PRESSURE: 154 MMHG

## 2019-01-24 RX ADMIN — HEPARIN SODIUM SCH UNITS: 5000 INJECTION INTRAVENOUS; SUBCUTANEOUS at 09:02

## 2019-01-24 RX ADMIN — DIVALPROEX SODIUM SCH MG: 250 TABLET, FILM COATED, EXTENDED RELEASE ORAL at 14:04

## 2019-01-24 RX ADMIN — LEVOTHYROXINE SODIUM SCH MCG: 125 TABLET ORAL at 06:00

## 2019-01-24 RX ADMIN — HYDRALAZINE HYDROCHLORIDE SCH MG: 25 TABLET ORAL at 17:20

## 2019-01-24 RX ADMIN — MINOXIDIL SCH MG: 2.5 TABLET ORAL at 08:57

## 2019-01-24 RX ADMIN — HEPARIN SODIUM SCH UNITS: 5000 INJECTION INTRAVENOUS; SUBCUTANEOUS at 20:40

## 2019-01-24 RX ADMIN — HYDRALAZINE HYDROCHLORIDE SCH MG: 25 TABLET ORAL at 20:39

## 2019-01-24 RX ADMIN — HYDRALAZINE HYDROCHLORIDE SCH MG: 25 TABLET ORAL at 13:00

## 2019-01-24 RX ADMIN — Medication SCH TAB: at 08:58

## 2019-01-24 RX ADMIN — ASPIRIN SCH MG: 81 TABLET, DELAYED RELEASE ORAL at 08:57

## 2019-01-24 RX ADMIN — VITAMIN D, TAB 1000IU (100/BT) SCH INTLU: 25 TAB at 08:57

## 2019-01-24 RX ADMIN — Medication SCH TAB: at 17:19

## 2019-01-24 RX ADMIN — HYDRALAZINE HYDROCHLORIDE SCH MG: 25 TABLET ORAL at 08:58

## 2019-01-24 RX ADMIN — DIVALPROEX SODIUM SCH MG: 250 TABLET, FILM COATED, EXTENDED RELEASE ORAL at 17:19

## 2019-01-24 RX ADMIN — TAMSULOSIN HYDROCHLORIDE SCH MG: 0.4 CAPSULE ORAL at 20:39

## 2019-01-24 RX ADMIN — DIVALPROEX SODIUM SCH MG: 250 TABLET, FILM COATED, EXTENDED RELEASE ORAL at 08:57

## 2019-01-24 NOTE — CONSULTATION
History of Present Illness


General


Chief Complaint:  General Complaint


Referring physician:  MANUEL MARLOW


Reason for Consultation:  Failure to thrive





Present Illness


HPI


83 year old very pleasant male presented with failure to thrive.  Hx of failure 

to thrive with prior G tube placement which was able to be removed once 

tolerating more PO intake.  Since has been deteriorating and has also developed 

wounds because of malnutrition. surgery called to evaluate and assist with care 

/ management. patient seen, chart reviewed, patient examined.


Allergies:  


Coded Allergies:  


     No Known Allergies (Unverified , 3/20/18)





Medication History


Scheduled


Amlodipine Besylate* (Amlodipine Besylate*), 5 MG PO BID, (Reported)


Aspirin* (Aspir 81*), 81 MG ORAL DAILY, (Reported)


Cholecalciferol (Vitamin D3)* (Vitamin D*), 1,000 UNIT ORAL DAILY, (Reported)


Divalproex Sodium* (Depakote Er*), 250 MG ORAL TID, (Reported)


Hydralazine Hcl* (Hydralazine Hcl*), 25 MG PO QID, (Reported)


Labetalol Hcl* (Normodyne*), 400 MG PO EVERY 12 HOURS, (Reported)


Levetiracetam* (Levetiracetam*), 500 MG PO BID, (Reported)


Levothyroxine Sodium* (Levothyroxine Sodium*), 125 MCG PO DAILY, (Reported)


Methoxy Peg-Epoetin Beta (Mircera), 75 MCG IJ Q2WKS ON 3RD HD, (Reported)


Minoxidil* (Loniten*), 5 MG PO DAILY, (Reported)


Tamsulosin Hcl (Tamsulosin Hcl*), 0.4 MG PO BEDTIME, (Reported)


Vitamin B Cmplx/Vit C/Folic AC (Nephro-Saadia Tablet), 1 TAB PO BID, (Reported)





Patient History


History Provided By:  Patient, Medical Record, PMD


Healthcare decision maker


N


Resuscitation status





Advanced Directive on File








Past Medical/Surgical History


Past Medical/Surgical History:  


(1) Arthritis


(2) Hypothyroidism


(3) Chronic pancreatitis


(4) HTN (hypertension)


(5) Colonoscopy planned


(6) Encounter for generalized patient complaints


(7) Constipation


(8) Anemia


(9) Dementia


(10) GERD (gastroesophageal reflux disease)


(11) Severe malnutrition


(12) PEG (percutaneous endoscopic gastrostomy) adjustment/replacement/removal


(13) Dehydration


(14) Weakness


(15) ESRD (end stage renal disease)





Review of Systems


All Other Systems:  negative except mentioned in HPI





Physical Exam


General Appearance:  no apparent distress, alert, thin


Lines, tubes and drains:  peripheral


HEENT:  anicteric


Neck:  normal inspection


Respiratory/Chest:  normal breath sounds, no respiratory distress, no accessory 

muscle use


Cardiovascular/Chest:  normal rate


Abdomen:  soft, no organomegaly, no mass, other


Extremities:  normal inspection, other


Skin Exam:  warm/dry, other


Neurologic:  alert, responsive





Last 24 Hour Vital Signs








  Date Time  Temp Pulse Resp B/P (MAP) Pulse Ox O2 Delivery O2 Flow Rate FiO2


 


1/24/19 13:00    108/44    


 


1/24/19 08:58  68  150/54    


 


1/24/19 08:58    150/54    


 


1/24/19 08:58  68  150/54    


 


1/24/19 08:57    150/54    


 


1/24/19 04:30  68      


 


1/24/19 04:00 97.7  20 150/54 (86) 95   


 


1/24/19 00:47  66      


 


1/24/19 00:00 98.0  20 143/50 (81) 100   


 


1/23/19 23:56      Room Air  


 


1/23/19 23:00  64  157/64    


 


1/23/19 23:00    157/64    


 


1/23/19 21:00  64      


 


1/23/19 21:00 97.5  20 157/64 (95) 100   


 


1/23/19 20:04 98.0 84 18 151/74 97 Room Air  97


 


1/23/19 18:00 98.0 84 18 151/74 97 Room Air  


 


1/23/19 15:50  65 15   Room Air  97


 


1/23/19 15:50 98.4 65 18 122/62 98 Room Air  


 


1/23/19 15:38 98.4 70 18 122/62 98 Room Air  

















Intake and Output  


 


 1/23/19 1/24/19





 19:00 07:00


 


Intake Total 0 ml 


 


Balance 0 ml 


 


  


 


Intake Oral 0 ml 


 


# Voids  1


 


# Bowel Movements  2











Laboratory Tests








Test


  1/23/19


17:28


 


White Blood Count


  5.6 K/UL


(4.8-10.8)


 


Red Blood Count


  5.90 M/UL


(4.70-6.10)


 


Hemoglobin


  15.9 G/DL


(14.2-18.0)


 


Hematocrit


  54.3 %


(42.0-52.0)  H


 


Mean Corpuscular Volume 92 FL (80-99)  


 


Mean Corpuscular Hemoglobin


  27.0 PG


(27.0-31.0)


 


Mean Corpuscular Hemoglobin


Concent 29.3 G/DL


(32.0-36.0)  L


 


Red Cell Distribution Width


  20.0 %


(11.6-14.8)  H


 


Platelet Count


  164 K/UL


(150-450)


 


Mean Platelet Volume


  7.8 FL


(6.5-10.1)


 


Neutrophils (%) (Auto)


  59.8 %


(45.0-75.0)


 


Lymphocytes (%) (Auto)


  14.7 %


(20.0-45.0)  L


 


Monocytes (%) (Auto)


  8.5 %


(1.0-10.0)


 


Eosinophils (%) (Auto)


  15.0 %


(0.0-3.0)  H


 


Basophils (%) (Auto)


  2.0 %


(0.0-2.0)


 


Prothrombin Time


  10.4 SEC


(9.30-11.50)


 


Prothromb Time International


Ratio 1.0 (0.9-1.1)  


 


 


Activated Partial


Thromboplast Time 34 SEC (23-33)


H


 


Sodium Level


  142 MMOL/L


(136-145)


 


Potassium Level


  4.4 MMOL/L


(3.5-5.1)


 


Chloride Level


  105 MMOL/L


()


 


Carbon Dioxide Level


  30 MMOL/L


(21-32)


 


Anion Gap


  7 mmol/L


(5-15)


 


Blood Urea Nitrogen


  11 mg/dL


(7-18)


 


Creatinine


  3.0 MG/DL


(0.55-1.30)  H


 


Estimat Glomerular Filtration


Rate  mL/min (>60)  


 


 


Glucose Level


  120 MG/DL


()  H


 


Calcium Level


  9.7 MG/DL


(8.5-10.1)


 


Total Bilirubin


  0.4 MG/DL


(0.2-1.0)


 


Aspartate Amino Transf


(AST/SGOT) 27 U/L (15-37)


 


 


Alanine Aminotransferase


(ALT/SGPT) 15 U/L (12-78)


 


 


Alkaline Phosphatase


  207 U/L


()  H


 


Troponin I


  0.008 ng/mL


(0.000-0.056)


 


Total Protein


  9.0 G/DL


(6.4-8.2)  H


 


Albumin


  2.8 G/DL


(3.4-5.0)  L


 


Globulin 6.2 g/dL  


 


Albumin/Globulin Ratio


  0.5 (1.0-2.7)


L


 


Thyroid Stimulating Hormone


(TSH) 5.673 uiU/mL


(0.358-3.740)


 


Serum Alcohol < 3 mg/dL  











Microbiology








 Date/Time


Source Procedure


Growth Status


 


 


 1/23/19 22:00


Foot Right Gram Stain - Final Resulted


 


 1/23/19 22:00


Foot Right Wound Culture


Pending Resulted








Height (Feet):  5


Height (Inches):  8.00


Weight (Pounds):  160


Medications





Current Medications








 Medications


  (Trade)  Dose


 Ordered  Sig/Simon


 Route


 PRN Reason  Start Time


 Stop Time Status Last Admin


Dose Admin


 


 Amlodipine


 Besylate


  (Norvasc)  5 mg  BID


 ORAL


   1/24/19 09:00


 2/23/19 08:59  1/24/19 08:58


 


 


 Aspirin


  (Ecotrin)  81 mg  DAILY


 ORAL


   1/24/19 09:00


 2/23/19 08:59  1/24/19 08:57


 


 


 Cefoxitin Sodium


 1 gm/Dextrose  55 ml @ 


 110 mls/hr  ONCE  PRN


 IV


 on call to GI lab  1/25/19 08:00


 1/26/19 07:59   


 


 


 Divalproex Sodium


  (Depakote ER)  250 mg  TID


 ORAL


   1/24/19 09:00


 2/23/19 08:59  1/24/19 14:04


 


 


 Heparin Sodium


  (Porcine)


  (Heparin 5000


 units/ml)  5,000 units  EVERY 12  HOURS


 SUBQ


   1/24/19 09:00


 2/23/19 08:59  1/24/19 09:02


 


 


 Hydralazine HCl


  (Apresoline)  25 mg  QID


 ORAL


   1/23/19 23:00


 2/22/19 22:59  1/24/19 08:58


 


 


 Labetalol HCl


  (Normodyne)  400 mg  EVERY 12  HOURS


 ORAL


   1/23/19 23:00


 2/22/19 22:59  1/24/19 08:58


 


 


 Levetiracetam


  (Keppra)  500 mg  BID


 ORAL


   1/24/19 09:00


 2/23/19 08:59  1/24/19 08:58


 


 


 Levothyroxine


 Sodium


  (Synthroid)  125 mcg  DAILY@0630


 ORAL


   1/24/19 06:30


 2/23/19 06:29   


 


 


 Minoxidil


  (Loniten)  5 mg  DAILY


 ORAL


   1/24/19 09:00


 2/23/19 08:59  1/24/19 08:57


 


 


 Tamsulosin HCl


  (Flomax)  0.4 mg  BEDTIME


 ORAL


   1/23/19 23:00


 2/22/19 22:59   


 


 


 Vitamin B Complex/


 Vit C/Folic Acid


  (Nephrovite)  1 tab  BID


 ORAL


   1/24/19 09:00


 2/23/19 08:59  1/24/19 08:58


 


 


 Vitamin D


  (Vitamin D)  1,000 intlu  DAILY


 ORAL


   1/24/19 09:00


 2/23/19 08:59  1/24/19 08:57


 











Assessment/Plan


Problem List:  


(1) Severe malnutrition


Assessment & Plan:  Failure to thrive resulting in loss of weight, weakness, 

and now developing wounds





DTPI to R heel .Wound bed black with red tinged borders and is fluctuant(L)

3.6cm x(W)5.4cm.Hyperpigmentation periwound. Stable dry eschar L heel (L)2.6cm 

x (W)3.8cm.Hyperpigmented skin periwound.Sacrum is intact without redness but 

pt verbalized tenderness when sacrococcygeal area palpated.Moisture barrier 

applied and covered with Optifoam drsg.





GI for PEG placement


Nutritional optimization necessary to help with wound healing


Swab R and L heels with Betadine.Cover with Optifoam Daily and prn.


Off-load Heels with Pillow.


Apply Moisture Barrier to Sacrum. Cover with Optifoam drsg.Change every 7 days 

and prn.


Reposition at least every 2hours or as tolerated.


Support surface mattress.


thank you will follow with recs


ICD Codes:  E43 - Unspecified severe protein-calorie malnutrition


SNOMED:  [id number]











Grant Bear Jan 24, 2019 14:50

## 2019-01-24 NOTE — NUR
NURSE NOTES:

Received report from Hank BURCH. Pt is awake, alert, oriented x1 to name/confused. On room 
air with no respiratory distress. Denies pain, and displays no signs/symptoms of any 
pain/discomfort. IV access on right AC #20G, saline lock, patent/intact. AV shunt on left UA 
for dialysis, last dialysis treatment was on 01/23/19 prior to admission per night/shift 
report. Side rails padded for seizure precautions, suction set up, oxygen available. Call 
light is placed is within easy reach, bed in lowest position, two side rails up, brakes 
engaged, alarm on.

## 2019-01-24 NOTE — NUR
NURSE NOTES:

Bedside report received from MARCIN Rosado.  Pt is resting in bed.  Arousable by name, otherwise 
confused.  Cardiac monitor showing NSR.  On RA; sating well, VSS.  Pt is NPO at [facility] for EGD 
with possible PEG placement and biopsy; consent in the chart.  Skin is clean and dry, 
dressings intact.  MASOUD shunt noted, asymptomatic.  RAC 20g SL; patent.  Bed is locked in 
lowest position, SR x3, call bell within reach, bed alarm engaged.  Will continue to monitor 
and follow plan of care.

## 2019-01-24 NOTE — NUR
NURSE NOTES:WOUND CARE NOTES:Pt presents with DTPI to R heel .Wound bed black with red 
tinged borders and is fluctuant(L)3.6cm x(W)5.4cm.Hyperpigmentation periwound. Stable dry 
eschar L heel (L)2.6cm x (W)3.8cm.Hyperpigmented skin periwound.Sacrum is intact without 
redness but pt verbalized tenderness when sacrococcygeal area palpated.Moisture barrier 
applied and covered with Optifoam drsg.



Tx.Plan:Swab R and L heels with Betadine.Cover with Optifoam Daily and prn.

           Off-load Heels with Pillow.

           Apply Moisture Barrier to Sacrum. Cover with Optifoam drsg.Change every 7 days 
and prn.

           Reposition at least every 2hours or as tolerated.

           Support surface mattress.

## 2019-01-24 NOTE — NUR
HAND-OFF: 

Report given to Gely BURCH. Pt is resting in bed in stable condition. Endorsed plan of care; 
SPR mattress ordered, consent for EGD/PEG received/filed in chart, NPO midnight, hold 
Heparin, contacted [facility] dialysis for tomorrow, spoke with Slade.

## 2019-01-24 NOTE — GI INITIAL CONSULT NOTE
History of Present Illness


General


Date patient seen:  Jan 24, 2019


Time patient seen:  10:55


Reason for Hospitalization:  General Complaint


Referring physician:  MANUEL MARLOW


Reason for Consultation:  Failure to thrive





Present Illness


HPI


Patient is an 83-year-old male sent in by facility for generalized weakness and 

failure to thrive.  Patient had prior history of end-stage renal disease.  

Patient is currently on dialysis.  He was noted to have decreased oral 

intake.He reportedly had been dialyzed earlier in the day.


GI consulted for PEG evaluation.  Initial HPI as noted above.  The patient was 

seen, awake alert and oriented x2-3.  No apparent distress.  No active signs or 

symptoms of nausea or vomiting.  According to the patient, he states that he is 

sufficiently eating well.  The patient had a history of upper endoscopy and 

colonoscopy done last year in March 2018.  Status post G-tube removal in May 

2018.  The site healed without any complications and the patient was able to 

tolerate diet at the time. Patient presents today with reported decreased oral 

intake.  Labs reviewed; no anemia.  No leukocytosis.  No transaminitis.


Home Meds


Reported Medications


Methoxy Peg-Epoetin Beta (Mircera) 75 Mcg/0.3 Ml Syringe, 75 MCG IJ Q2WKS ON 

3RD HD


   1/23/19


Cholecalciferol (Vitamin D3)* (VITAMIN D*) 1,000 Unit Tablet, 1000 UNIT ORAL 

DAILY, TAB


   1/23/19


Divalproex Sodium* (DEPAKOTE ER*) 250 Mg Tab.er.24h, 250 MG ORAL TID, TAB


   3/20/18


Aspirin* (ASPIR 81*) 81 Mg Tablet.dr, 81 MG ORAL DAILY, TAB


   3/20/18


Tamsulosin Hcl (TAMSULOSIN HCL*) 0.4 Mg Cap.er.24h, 0.4 MG PO BEDTIME, CAP


   9/26/17


Vitamin B Cmplx/Vit C/Folic AC (Nephro-Saadia Tablet) 0.8 Mg Tablet, 1 TAB PO BID


   9/26/17


Minoxidil* (LONITEN*) 2.5 Mg Tablet, 5 MG PO DAILY, TAB


   9/26/17


Levothyroxine Sodium* (LEVOTHYROXINE SODIUM*) 125 Mcg Tablet, 125 MCG PO DAILY, 

TAB


   Take in the morning on an empty stomach, at least 30 minutes before


   food.


   9/26/17


Levetiracetam* (LEVETIRACETAM*) 100 Mg/1 Ml Solution, 500 MG PO BID


   9/26/17


Labetalol Hcl* (NORMODYNE*) 100 Mg Tablet, 400 MG PO EVERY 12 HOURS, TAB


   9/26/17


Hydralazine Hcl* (HYDRALAZINE HCL*) 25 Mg Tablet, 25 MG PO QID, TAB 0 Refills


   9/26/17


Amlodipine Besylate* (AMLODIPINE BESYLATE*) 5 Mg Tablet, 5 MG PO BID, TAB


   9/26/17


Med list reviewed/reconciled:  Yes


Allergies:  


Coded Allergies:  


     No Known Allergies (Unverified , 3/20/18)





Patient History


Limited by:  medical condition


History Provided By:  Patient, Medical Record


PMH Narrative


Past Medical History:  see triage record


Reviewed Nursing Documentation:  PMH: Agreed; PSxH: Agreed





Nursing Documentation-PMH


Past Medical History:  No History, Except For


Hx Cardiac Problems:  Yes - hypothyroidism, anemia, CHF


Hx Hypertension:  Yes


Hx COPD:  Yes


Hx Diabetes:  Yes - type II


Hx Cancer:  No


Hx Gastrointestinal Problems:  Yes - dysphagia, g-tube, GERD


Hx Dialysis:  Yes - [facility]


Hx Neurological Problems:  Yes - s/p left hip fx on 11/18, muscle weakness


Hx Cerebrovascular Accident:  Yes


Hx Transient Ischemic Attacks:  Yes


Hx Dementia:  Yes


Hx Seizures:  Yes


Social History:  Denies: smoking, alcohol use, drug use, other





Review of Systems


All Other Systems:  negative except mentioned in HPI





Physical Exam





Vital Signs








  Date Time  Temp Pulse Resp B/P (MAP) Pulse Ox O2 Delivery O2 Flow Rate FiO2


 


1/23/19 15:38 98.4 70 18 122/62 98 Room Air  


 


1/23/19 15:50        97








Sp02 EP Interpretation:  reviewed, normal


Labs





Laboratory Tests








Test


  1/23/19


17:28


 


White Blood Count


  5.6 K/UL


(4.8-10.8)


 


Red Blood Count


  5.90 M/UL


(4.70-6.10)


 


Hemoglobin


  15.9 G/DL


(14.2-18.0)


 


Hematocrit


  54.3 %


(42.0-52.0)  H


 


Mean Corpuscular Volume 92 FL (80-99)  


 


Mean Corpuscular Hemoglobin


  27.0 PG


(27.0-31.0)


 


Mean Corpuscular Hemoglobin


Concent 29.3 G/DL


(32.0-36.0)  L


 


Red Cell Distribution Width


  20.0 %


(11.6-14.8)  H


 


Platelet Count


  164 K/UL


(150-450)


 


Mean Platelet Volume


  7.8 FL


(6.5-10.1)


 


Neutrophils (%) (Auto)


  59.8 %


(45.0-75.0)


 


Lymphocytes (%) (Auto)


  14.7 %


(20.0-45.0)  L


 


Monocytes (%) (Auto)


  8.5 %


(1.0-10.0)


 


Eosinophils (%) (Auto)


  15.0 %


(0.0-3.0)  H


 


Basophils (%) (Auto)


  2.0 %


(0.0-2.0)


 


Prothrombin Time


  10.4 SEC


(9.30-11.50)


 


Prothromb Time International


Ratio 1.0 (0.9-1.1)  


 


 


Activated Partial


Thromboplast Time 34 SEC (23-33)


H


 


Sodium Level


  142 MMOL/L


(136-145)


 


Potassium Level


  4.4 MMOL/L


(3.5-5.1)


 


Chloride Level


  105 MMOL/L


()


 


Carbon Dioxide Level


  30 MMOL/L


(21-32)


 


Anion Gap


  7 mmol/L


(5-15)


 


Blood Urea Nitrogen


  11 mg/dL


(7-18)


 


Creatinine


  3.0 MG/DL


(0.55-1.30)  H


 


Estimat Glomerular Filtration


Rate  mL/min (>60)  


 


 


Glucose Level


  120 MG/DL


()  H


 


Calcium Level


  9.7 MG/DL


(8.5-10.1)


 


Total Bilirubin


  0.4 MG/DL


(0.2-1.0)


 


Aspartate Amino Transf


(AST/SGOT) 27 U/L (15-37)


 


 


Alanine Aminotransferase


(ALT/SGPT) 15 U/L (12-78)


 


 


Alkaline Phosphatase


  207 U/L


()  H


 


Troponin I


  0.008 ng/mL


(0.000-0.056)


 


Total Protein


  9.0 G/DL


(6.4-8.2)  H


 


Albumin


  2.8 G/DL


(3.4-5.0)  L


 


Globulin 6.2 g/dL  


 


Albumin/Globulin Ratio


  0.5 (1.0-2.7)


L


 


Thyroid Stimulating Hormone


(TSH) 5.673 uiU/mL


(0.358-3.740)


 


Serum Alcohol < 3 mg/dL  








General Appearance:  well appearing, no apparent distress, alert


Head:  normocephalic


EENT:  PERRL/EOMI, normal ENT inspection


Neck:  supple


Respiratory:  normal breath sounds, no respiratory distress


Cardiovascular:  normal rate


Gastrointestinal:  normal inspection, non tender, soft, normal bowel sounds, non

-distended


Rectal:  deferred


Genitourinary:  deferred


Musculoskeletal:  normal inspection, back normal


Neurologic:  alert, responsive


Psychiatric:  normal inspection, judgement/insight normal, memory normal


Skin:  normal inspection, normal color, no rash, warm/dry, palpation normal, 

well hydrated


Lymphatic:  normal inspection, no adenopathy


Current Medications





Current Medications








 Medications


  (Trade)  Dose


 Ordered  Sig/Simon


 Route


 PRN Reason  Start Time


 Stop Time Status Last Admin


Dose Admin


 


 Amlodipine


 Besylate


  (Norvasc)  5 mg  BID


 ORAL


   1/24/19 09:00


 2/23/19 08:59  1/24/19 08:58


 


 


 Aspirin


  (Ecotrin)  81 mg  DAILY


 ORAL


   1/24/19 09:00


 2/23/19 08:59  1/24/19 08:57


 


 


 Divalproex Sodium


  (Depakote ER)  250 mg  TID


 ORAL


   1/24/19 09:00


 2/23/19 08:59  1/24/19 08:57


 


 


 Heparin Sodium


  (Porcine)


  (Heparin 5000


 units/ml)  5,000 units  EVERY 12  HOURS


 SUBQ


   1/24/19 09:00


 2/23/19 08:59  1/24/19 09:02


 


 


 Hydralazine HCl


  (Apresoline)  25 mg  QID


 ORAL


   1/23/19 23:00


 2/22/19 22:59  1/24/19 08:58


 


 


 Labetalol HCl


  (Normodyne)  400 mg  EVERY 12  HOURS


 ORAL


   1/23/19 23:00


 2/22/19 22:59  1/24/19 08:58


 


 


 Levetiracetam


  (Keppra)  500 mg  BID


 ORAL


   1/24/19 09:00


 2/23/19 08:59  1/24/19 08:58


 


 


 Levothyroxine


 Sodium


  (Synthroid)  125 mcg  DAILY@0630


 ORAL


   1/24/19 06:30


 2/23/19 06:29   


 


 


 Minoxidil


  (Loniten)  5 mg  DAILY


 ORAL


   1/24/19 09:00


 2/23/19 08:59  1/24/19 08:57


 


 


 Tamsulosin HCl


  (Flomax)  0.4 mg  BEDTIME


 ORAL


   1/23/19 23:00


 2/22/19 22:59   


 


 


 Vitamin B Complex/


 Vit C/Folic Acid


  (Nephrovite)  1 tab  BID


 ORAL


   1/24/19 09:00


 2/23/19 08:59  1/24/19 08:58


 


 


 Vitamin D


  (Vitamin D)  1,000 intlu  DAILY


 ORAL


   1/24/19 09:00


 2/23/19 08:59  1/24/19 08:57


 











GI: Plan


Problems:  


(1) Severe malnutrition


(2) Dehydration


(3) PEG (percutaneous endoscopic gastrostomy) adjustment/replacement/removal


(4) GERD (gastroesophageal reflux disease)


(5) Dementia


(6) Anemia


(7) Constipation


(8) Encounter for generalized patient complaints


Plan


EGD/colonoscopy done 03/2018.


s/p GT removal 5/2018





PEG scheduled for tomorrow.


- NPO @ [facility].


- hold all blood thinners


Calorie count ordered for 48 hours


One-to-one feeder


Strict aspiration precaution


PPI


IV p.o. hydration


We will follow with additional recommendations post procedure





Discussed with Dr. Gupta.


Thank you for this patient referral, we will follow.





The patient was seen and examined at bedside and all new and available data was 

reviewed in the patients chart. I agree with the above findings, impression 

and plan.  (Patient seen earlier today. Signature stamp does not reflect 

patient encounter time.). - MD Jessica FlahertyUnited [facility] NP Jan 24, 2019 11:01

## 2019-01-24 NOTE — HISTORY AND PHYSICAL REPORT
DATE OF ADMISSION:  01/23/2019

CHIEF COMPLAINT:  Weight loss and anorexia.



HISTORY OF PRESENT ILLNESS:  This is an 83-year-old  male,

who is on dialysis every Monday, Wednesday, and Friday.  During the last

few months, the patient has been noticed to be extremely malnourished.

The patient is demented.  He is from a [facility] under the

care of Dr. Yael Dean.  Previously, he had a G-tube that was removed

several months ago.  After removal of the G-tube, the patient declined

nutritionally again.  The patient does not consume enough calories and

protein.  I called Dr. Dean and obtained her consent to put the patient in

this hospital for placement of a G-tube.



PAST MEDICAL HISTORY:

1. End-stage renal failure due to diabetic nephropathy.

2. Degenerative joint disease.

3. Organic brain syndrome.

4. Hypertensive cardiovascular disease.

5. Hypothyroidism.

6. Seizure disorder.



MEDICATIONS:  Medications from the nursing home, amlodipine, baby aspirin,

vitamin D3, Depakote, hydralazine, labetalol, Keppra, Synthroid, Epogen,

minoxidil, tamsulosin, and multivitamin.



ALLERGIES:  No known drug allergies.



FAMILY HISTORY:  Unable to obtain due to mental status.



SOCIAL HISTORY:  Unable to obtain due to mental status.



REVIEW OF SYSTEMS:  Unable to obtain due to mental status.



PHYSICAL EXAMINATION:

GENERAL:  This is an elderly  male, who is in no acute

distress.

VITAL SIGNS:  Blood pressure 108/44, pulse 62 and regular, respirations 20,

and temperature 97.3 oral.

HEENT:  The head is normocephalic and atraumatic.  He has very poor oral

dentition.

NECK:  Supple.  Trachea midline.  There was no lymphadenopathy or

thyromegaly.

LUNGS:  Clear to auscultation and percussion.

HEART:  Regular rate and rhythm without rubs, murmurs, or gallops.

ABDOMEN:  Soft and nontender.  Bowel sounds were active.

EXTREMITIES:  He has right heel wound with tinged borders and is fluctuant.

He also has an eschar of the left heel.

NEUROLOGIC:  The patient is confused.  There were no gross focal

findings.



LABORATORY AND ANCILLARY DATA:  CBC within normal limits.  Serum chemistry,

creatinine 3, BUN 11 post dialysis, and glucose 120.  Alkaline phosphatase

207.  Albumin is 2.8.  TSH 5.6.



ASSESSMENT:  Severe protein-calorie malnutrition.



PLAN:

1. Obtain G-tube.  The plan is to get one tomorrow.

2. Podiatry and General Surgery consult for the patient's wound.

3. Hemodialysis tomorrow.  The patient is scheduled.









  ______________________________________________

  Patricia Holley M.D.





DR:  DEA

D:  01/24/2019 17:18

T:  01/24/2019 20:48

JOB#:  [id number]

CC:

## 2019-01-24 NOTE — NUR
RD ASSESSMENT & RECOMMENDATIONS

SEE CARE ACTIVITY FOR COMPLETE ASSESSMENT



DAILY ESTIMATED NEEDS:

Needs based on ESRD ON HD, wound 65kg  

30-35  kcals/kg 

7958-1796  total kcals

1.25-1.8  g protein/kg

  g total protein 

Fluid per MD, on HD   





NUTRITION DIAGNOSIS:

Increased kcal and protein needs r/t wound healing and renal dysfunction

as evidenced by pt w/ R heel DTI, L heel unstageable wound, w/ ESRD on HD.



CURRENT DIET:RENAL/ CCHO MED     





PO DIET RECOMMENDATIONS:

RENAL DIET (texture as tolerated)  





ADDITIONAL RECOMMENDATIONS:

1) F/up w/ H&P  

2) Monitor po intake closely-> FTT per MD 

3) Add NEPRO 1 tetra roque daily (425 kcal each) 

4) Add snacks BID in b/w meals  

5) Check A1C / eval of BG  

6) Wound care: add ALYSE BID  

    ->REC VIT C per Nephrology

## 2019-01-24 NOTE — NUR
CASE MANAGEMENT: REVIEW



83/M BIBA FROM  [facility] 



CC: GENERALIZED WEAKNESS





IS: DEHYDRATION . ESRD ON HD . MALNUTRITION 

T 97.5 HR 64 RR 18 /74 SAT 97% ROOM AIR

ALK PHOS 207 TSH 5.673 





SI: NS FLUSH X1

GI AND SURGICAL CONSULT FOR PEG PLACEMENT 



***PATIENT ADMITTED TO TELEMETRY UNIT 01/23/2019***

DCP: PATIENT IS FROM [facility]

## 2019-01-25 VITALS — SYSTOLIC BLOOD PRESSURE: 125 MMHG | DIASTOLIC BLOOD PRESSURE: 50 MMHG

## 2019-01-25 VITALS — DIASTOLIC BLOOD PRESSURE: 48 MMHG | SYSTOLIC BLOOD PRESSURE: 147 MMHG

## 2019-01-25 VITALS — SYSTOLIC BLOOD PRESSURE: 150 MMHG | DIASTOLIC BLOOD PRESSURE: 57 MMHG

## 2019-01-25 VITALS — SYSTOLIC BLOOD PRESSURE: 149 MMHG | DIASTOLIC BLOOD PRESSURE: 69 MMHG

## 2019-01-25 VITALS — DIASTOLIC BLOOD PRESSURE: 36 MMHG | SYSTOLIC BLOOD PRESSURE: 102 MMHG

## 2019-01-25 VITALS — SYSTOLIC BLOOD PRESSURE: 131 MMHG | DIASTOLIC BLOOD PRESSURE: 75 MMHG

## 2019-01-25 VITALS — DIASTOLIC BLOOD PRESSURE: 57 MMHG | SYSTOLIC BLOOD PRESSURE: 121 MMHG

## 2019-01-25 VITALS — DIASTOLIC BLOOD PRESSURE: 38 MMHG | SYSTOLIC BLOOD PRESSURE: 117 MMHG

## 2019-01-25 VITALS — DIASTOLIC BLOOD PRESSURE: 50 MMHG | SYSTOLIC BLOOD PRESSURE: 148 MMHG

## 2019-01-25 VITALS — DIASTOLIC BLOOD PRESSURE: 56 MMHG | SYSTOLIC BLOOD PRESSURE: 139 MMHG

## 2019-01-25 LAB
ADD MANUAL DIFF: NO
ANION GAP SERPL CALC-SCNC: 7 MMOL/L (ref 5–15)
APTT BLD: 36 SEC (ref 23–33)
BASOPHILS NFR BLD AUTO: 1.6 % (ref 0–2)
BUN SERPL-MCNC: 34 MG/DL (ref 7–18)
CALCIUM SERPL-MCNC: 9.7 MG/DL (ref 8.5–10.1)
CHLORIDE SERPL-SCNC: 106 MMOL/L (ref 98–107)
CO2 SERPL-SCNC: 29 MMOL/L (ref 21–32)
CREAT SERPL-MCNC: 4.9 MG/DL (ref 0.55–1.3)
EOSINOPHIL NFR BLD AUTO: 17.8 % (ref 0–3)
ERYTHROCYTE [DISTWIDTH] IN BLOOD BY AUTOMATED COUNT: 20.3 % (ref 11.6–14.8)
HCT VFR BLD CALC: 53.7 % (ref 42–52)
HGB BLD-MCNC: 16 G/DL (ref 14.2–18)
INR PPP: 1 (ref 0.9–1.1)
LYMPHOCYTES NFR BLD AUTO: 15.5 % (ref 20–45)
MCV RBC AUTO: 91 FL (ref 80–99)
MONOCYTES NFR BLD AUTO: 8.1 % (ref 1–10)
NEUTROPHILS NFR BLD AUTO: 57 % (ref 45–75)
PLATELET # BLD: 213 K/UL (ref 150–450)
POTASSIUM SERPL-SCNC: 5.3 MMOL/L (ref 3.5–5.1)
RBC # BLD AUTO: 5.89 M/UL (ref 4.7–6.1)
SODIUM SERPL-SCNC: 142 MMOL/L (ref 136–145)
WBC # BLD AUTO: 7.3 K/UL (ref 4.8–10.8)

## 2019-01-25 PROCEDURE — 5A1D70Z PERFORMANCE OF URINARY FILTRATION, INTERMITTENT, LESS THAN 6 HOURS PER DAY: ICD-10-PCS

## 2019-01-25 PROCEDURE — 0DH63UZ INSERTION OF FEEDING DEVICE INTO STOMACH, PERCUTANEOUS APPROACH: ICD-10-PCS

## 2019-01-25 RX ADMIN — Medication SCH TAB: at 09:00

## 2019-01-25 RX ADMIN — MINOXIDIL SCH MG: 2.5 TABLET ORAL at 09:00

## 2019-01-25 RX ADMIN — VITAMIN D, TAB 1000IU (100/BT) SCH INTLU: 25 TAB at 12:49

## 2019-01-25 RX ADMIN — DIVALPROEX SODIUM SCH MG: 125 CAPSULE ORAL at 22:18

## 2019-01-25 RX ADMIN — LEVETIRACETAM SCH MG: 100 SOLUTION ORAL at 22:21

## 2019-01-25 RX ADMIN — HEPARIN SODIUM SCH UNITS: 5000 INJECTION INTRAVENOUS; SUBCUTANEOUS at 22:19

## 2019-01-25 RX ADMIN — TAMSULOSIN HYDROCHLORIDE SCH MG: 0.4 CAPSULE ORAL at 21:00

## 2019-01-25 RX ADMIN — DIVALPROEX SODIUM SCH MG: 250 TABLET, FILM COATED, EXTENDED RELEASE ORAL at 12:50

## 2019-01-25 RX ADMIN — LEVOTHYROXINE SODIUM SCH MCG: 125 TABLET ORAL at 05:54

## 2019-01-25 RX ADMIN — HEPARIN SODIUM SCH UNITS: 5000 INJECTION INTRAVENOUS; SUBCUTANEOUS at 09:00

## 2019-01-25 RX ADMIN — HYDRALAZINE HYDROCHLORIDE SCH MG: 25 TABLET ORAL at 09:00

## 2019-01-25 RX ADMIN — HYDRALAZINE HYDROCHLORIDE SCH MG: 25 TABLET ORAL at 22:18

## 2019-01-25 RX ADMIN — HYDRALAZINE HYDROCHLORIDE SCH MG: 25 TABLET ORAL at 12:50

## 2019-01-25 RX ADMIN — DIVALPROEX SODIUM SCH MG: 250 TABLET, FILM COATED, EXTENDED RELEASE ORAL at 09:00

## 2019-01-25 RX ADMIN — ASPIRIN SCH MG: 81 TABLET, DELAYED RELEASE ORAL at 09:00

## 2019-01-25 NOTE — NUR
RD ASSESSMENT & RECOMMENDATIONS

SEE CARE ACTIVITY FOR COMPLETE ASSESSMENT



***RD CONSULT FOR TF RECOMMENDATION***



DAILY ESTIMATED NEEDS:

Needs based on ESRD ON HD, wound 65kg  

25-30  kcals/kg 

4146-4902  total kcals

1.25-1.8  g protein/kg

  g total protein 

Fluid per MD, on HD   mL/kg

 total fluid mLs



NUTRITION DIAGNOSIS:

- Increased kcal and protein needs r/t wound healing and renal dysfunction

as evidenced by pt w/ R heel DTI, L heel unstageable wound, w/ ESRD on HD.

- Inadequate oral intake R/T poor appetite, clinical condition as

evidenced by s/p PEG placement to meet nutritional needs w/ GT feeding.





CURRENT DIET:NPO    





CURRENT TF:NEPRO @ 40ML/HR X 24 HRS 





PO DIET RECOMMENDATIONS:

FOR ORAL GRAT IF APPROPRIATE-> RENAL DIET (texture as tolerated) 





ENTERAL NUTRITION RECOMMENDATIONS:

Nepro @ 45ml/hr x 24 hrs  to provide 1080ml, 1944kcal, 87g prot, 785ml free water 



* Rec to increase goal rate to 45ml/hr- meets 100% est kcal/prot needs

* Rec to initiate Nepro @ 15ml/hr x 6 hrs, advance 10ml q 4-6 hrs as tolerated to goal rate.

* HOB over 30 degrees/ water flush per MD







ADDITIONAL RECOMMENDATIONS:

1) Monitor lytes closely w/ TF, replete as needed 

2) Wound care: add ALYSE BID  

3) Check A1C / eval of BG  

4) Obtain dry wt post HD 

. 
## 2019-01-25 NOTE — 48 HOUR POST ANESTHESIA EVAL
Post Anesthesia Evaluation


Procedure:  egd/peg


Date of Evaluation:  Jan 25, 2019


Time of Evaluation:  10:48


Blood Pressure Systolic:  102


0:  42


Pulse Rate:  59


Respiratory Rate:  18


Temperature (Fahrenheit):  97.3


O2 Sat by Pulse Oximetry:  100


Airway:  patent


Nausea:  No


Vomiting:  No


Pain Intensity:  0


Hydration Status:  adequate


Cardiopulmonary Status:


stable


Mental Status/LOC:  patient returned to baseline


Post-Anesthesia Complications:


none


Follow-up care needed:  N/A











Susanna Maguire MD Jan 25, 2019 10:49

## 2019-01-25 NOTE — SURGERY PROGRESS NOTE
Surgery Progress Note


Subjective


Additional Comments


scope today.  otherwise stable. labs noted





Objective





Last 24 Hour Vital Signs








  Date Time  Temp Pulse Resp B/P (MAP) Pulse Ox O2 Delivery O2 Flow Rate FiO2


 


1/25/19 10:53  59 18 147/48 99 Room Air  


 


1/25/19 10:49  59 18  100   


 


1/25/19 10:47  59 18  100   


 


1/25/19 10:43  59 18 148/50 100 Room Air  


 


1/25/19 10:38  59 18 117/38 100 Nasal Cannula 3 


 


1/25/19 10:33 97.3 59 18 102/36 100 Nasal Cannula 3 


 


1/25/19 09:00      Room Air  


 


1/25/19 08:00 97.1 64 20 149/69 (95) 91   


 


1/25/19 07:40  66      


 


1/25/19 04:00 97.1 63 18 125/50 (75) 99   


 


1/25/19 04:00  62      


 


1/25/19 00:00  62      


 


1/25/19 00:00 98.0 92 18 131/75 (93) 98   


 


1/24/19 21:00      Room Air  


 


1/24/19 20:39  69  142/60    


 


1/24/19 20:39    142/60    


 


1/24/19 20:00  67      


 


1/24/19 20:00 97.0 69 18 142/60 (87) 91   


 


1/24/19 17:21  61  128/48    


 


1/24/19 17:20    128/48    


 


1/24/19 16:00  66      


 


1/24/19 13:00    108/44    


 


1/24/19 12:00  62      


 


1/24/19 12:00 97.3 90 20 154/75 (101) 93   








I&O











Intake and Output  


 


 1/24/19 1/25/19





 18:59 06:59


 


Intake Total 260 ml 240 ml


 


Balance 260 ml 240 ml


 


  


 


Intake Oral 260 ml 240 ml


 


# Voids 1 


 


# Bowel Movements 2 








Dressing:  dry, other


Wound:  clean, other


Drains:  other


Cardiovascular:  RSR


Respiratory:  decreased breath sounds


Abdomen:  soft, present bowel sounds, non-distended


Extremities:  other





Laboratory Tests








Test


  1/25/19


05:50


 


White Blood Count


  7.3 K/UL


(4.8-10.8)


 


Red Blood Count


  5.89 M/UL


(4.70-6.10)


 


Hemoglobin


  16.0 G/DL


(14.2-18.0)


 


Hematocrit


  53.7 %


(42.0-52.0)  H


 


Mean Corpuscular Volume 91 FL (80-99)  


 


Mean Corpuscular Hemoglobin


  27.1 PG


(27.0-31.0)


 


Mean Corpuscular Hemoglobin


Concent 29.8 G/DL


(32.0-36.0)  L


 


Red Cell Distribution Width


  20.3 %


(11.6-14.8)  H


 


Platelet Count


  213 K/UL


(150-450)


 


Mean Platelet Volume


  6.9 FL


(6.5-10.1)


 


Neutrophils (%) (Auto)


  57.0 %


(45.0-75.0)


 


Lymphocytes (%) (Auto)


  15.5 %


(20.0-45.0)  L


 


Monocytes (%) (Auto)


  8.1 %


(1.0-10.0)


 


Eosinophils (%) (Auto)


  17.8 %


(0.0-3.0)  H


 


Basophils (%) (Auto)


  1.6 %


(0.0-2.0)


 


Prothrombin Time


  10.5 SEC


(9.30-11.50)


 


Prothromb Time International


Ratio 1.0 (0.9-1.1)  


 


 


Activated Partial


Thromboplast Time 36 SEC (23-33)


H


 


Sodium Level


  142 MMOL/L


(136-145)


 


Potassium Level


  5.3 MMOL/L


(3.5-5.1)  H


 


Chloride Level


  106 MMOL/L


()


 


Carbon Dioxide Level


  29 MMOL/L


(21-32)


 


Anion Gap


  7 mmol/L


(5-15)


 


Blood Urea Nitrogen


  34 mg/dL


(7-18)  H


 


Creatinine


  4.9 MG/DL


(0.55-1.30)  H


 


Estimat Glomerular Filtration


Rate  mL/min (>60)  


 


 


Glucose Level


  128 MG/DL


()  H


 


Calcium Level


  9.7 MG/DL


(8.5-10.1)











Plan


Problems:  


(1) Severe malnutrition


Assessment & Plan:  Failure to thrive resulting in loss of weight, weakness, 

and now developing wounds





DTPI to R heel .Wound bed black with red tinged borders and is fluctuant(L)

3.6cm x(W)5.4cm.Hyperpigmentation periwound. Stable dry eschar L heel (L)2.6cm 

x (W)3.8cm.Hyperpigmented skin periwound.Sacrum is intact without redness but 

pt verbalized tenderness when sacrococcygeal area palpated.Moisture barrier 

applied and covered with Optifoam drsg.





GI for PEG placement


Nutritional optimization necessary to help with wound healing


Swab R and L heels with Betadine.Cover with Optifoam Daily and prn.


Off-load Heels with Pillow.


Apply Moisture Barrier to Sacrum. Cover with Optifoam drsg.Change every 7 days 

and prn.


Reposition at least every 2hours or as tolerated.


Support surface mattress.


thank you will follow with Grant Brar Jan 25, 2019 11:18

## 2019-01-25 NOTE — PRE-PROCEDURE NOTE/ATTESTATION
Pre-Procedure Note/Attestation


Complete Prior to Procedure


Planned Procedure:  not applicable


Procedure Narrative:


egd/peg





Indications for Procedure


Pre-Operative Diagnosis:


dysphagia





Attestation


I attest that I discussed the nature of the procedure; its benefits; risks and 

complications; and alternatives (and the risks and benefits of such alternatives

), prior to the procedure, with the patient (or the patient's legal 

representative).





I attest that, if there was a reasonable possibility of needing a blood 

transfusion, the patient (or the patient's legal representative) was given the 

[facility] standardized written summary, pursuant 

to the Jermaine Riner Blood Safety Act ([place] Health and Safety Code # 1645, as 

amended).





I attest that I re-evaluated the patient just prior to the surgery and that 

there has been no change in the patient's H&P, except as documented below:











Alexy Gupta MD Jan 25, 2019 10:03

## 2019-01-25 NOTE — ENDOSCOPY PROCEDURE NOTE
Endoscopy Procedure Note


General


Indication for Procedure:  dysphgaia


Procedures Performed:  EGD, PEG


Operative Findings/Diagnosis:  same


Specimen:  none


Pt Tolerated Procedure Well:  Yes


Estimated Blood Loss:  none





Anesthesia


Anesthesiologist:  juan r


Anesthesia:  MAC





Inserted Devices


Implant(s) used?:  No





GI Core Measures


50 yrs or older w/o bx or poly:  Not Applicable


10yrs. F/U not recommended:  Not Applicable











Alexy Gupta MD Jan 25, 2019 10:24

## 2019-01-25 NOTE — NEPHROLOGY PROGRESS NOTE
Assessment/Plan


Plan


New PEG in. Dietary to recommend.


ESRD - HD to follow.


Expect DC tonight or early am





Subjective


Subjective


Confused. Post PEG.





Objective


Objective





Last 24 Hour Vital Signs








  Date Time  Temp Pulse Resp B/P (MAP) Pulse Ox O2 Delivery O2 Flow Rate FiO2


 


1/25/19 10:53  59 18 147/48 99 Room Air  


 


1/25/19 10:49  59 18  100   


 


1/25/19 10:47  59 18  100   


 


1/25/19 10:43  59 18 148/50 100 Room Air  


 


1/25/19 10:38  59 18 117/38 100 Nasal Cannula 3 


 


1/25/19 10:33 97.3 59 18 102/36 100 Nasal Cannula 3 


 


1/25/19 09:00      Room Air  


 


1/25/19 08:00 97.1 64 20 149/69 (95) 91   


 


1/25/19 07:40  66      


 


1/25/19 04:00 97.1 63 18 125/50 (75) 99   


 


1/25/19 04:00  62      


 


1/25/19 00:00  62      


 


1/25/19 00:00 98.0 92 18 131/75 (93) 98   


 


1/24/19 21:00      Room Air  


 


1/24/19 20:39  69  142/60    


 


1/24/19 20:39    142/60    


 


1/24/19 20:00  67      


 


1/24/19 20:00 97.0 69 18 142/60 (87) 91   


 


1/24/19 17:21  61  128/48    


 


1/24/19 17:20    128/48    


 


1/24/19 16:00  66      


 


1/24/19 13:00    108/44    


 


1/24/19 12:00  62      


 


1/24/19 12:00 97.3 90 20 154/75 (101) 93   

















Intake and Output  


 


 1/24/19 1/25/19





 18:59 06:59


 


Intake Total 260 ml 240 ml


 


Balance 260 ml 240 ml


 


  


 


Intake Oral 260 ml 240 ml


 


# Voids 1 


 


# Bowel Movements 2 








Laboratory Tests


1/25/19 05:50: 


White Blood Count 7.3, Red Blood Count 5.89, Hemoglobin 16.0, Hematocrit 53.7H, 

Mean Corpuscular Volume 91, Mean Corpuscular Hemoglobin 27.1, Mean Corpuscular 

Hemoglobin Concent 29.8L, Red Cell Distribution Width 20.3H, Platelet Count 213

, Mean Platelet Volume 6.9, Neutrophils (%) (Auto) 57.0, Lymphocytes (%) (Auto) 

15.5L, Monocytes (%) (Auto) 8.1, Eosinophils (%) (Auto) 17.8H, Basophils (%) (

Auto) 1.6, Prothrombin Time 10.5, Prothromb Time International Ratio 1.0, 

Activated Partial Thromboplast Time 36H, Sodium Level 142, Potassium Level 5.3H

, Chloride Level 106, Carbon Dioxide Level 29, Anion Gap 7, Blood Urea Nitrogen 

34H, Creatinine 4.9H, Estimat Glomerular Filtration Rate , Glucose Level 128H, 

Calcium Level 9.7


Height (Feet):  5


Height (Inches):  8.00


Weight (Pounds):  159


Objective


CV RR


Lungs CTA


Abd SNT. BS + Patricia Glynn MD Jan 25, 2019 11:25

## 2019-01-25 NOTE — NUR
NURSE NOTES:

Spoke with pharmacy regarding medications unable to be administered via GT route. Per 
pharmacist, for ASA change to ASA chewable, for depakote ER change order to depakote sln, 
and change Keppra to sln as well. Flomax unable to be administered via GT. Per pharmacist, 
alternative is doxasozin. MD Holley paged to request replacement orders for flomax. 
Awaiting call back for further instructions.

## 2019-01-25 NOTE — NUR
NURSE NOTES:

MD Holley paged regarding changing medication routes to GT s/p GT placement today. 
Awaiting call back for further instructions. G-tube noted to be patent and intact, running 
Nepro @ 40cc/hr. 20cc residual noted from g-tube. Continuing with tube feeding per protocol. 
HOB kept greater than 30 degrees while feeding is on. G-tube dressing noted to be dry and 
intact also. Abdominal binder in place per MD orders. Will continue with plan of care.

## 2019-01-25 NOTE — PROCEDURE NOTE
DATE OF PROCEDURE:  01/25/2019

PROCEDURE:  Upper endoscopy with PEG placement.



SURGEON:  Alexy Gupta M.D.



ANESTHESIA:  Per Dr. Valderrama.



INSTRUMENT:  Olympus upper endoscope.



INDICATION:  Dysphagia and failure to thrive.



REASON FOR PROCEDURE:  The procedure, risks, benefits, and possible

consequences, including hemorrhage, aspiration, perforation and infection,

and alternative treatments, were explained to the patient/legal guardian

by Dr. Alexy Gupta and the patient/legal guardian understood and

accepted these risks.



PROCEDURE IN DETAIL:  After informed consent was obtained and the patient

was adequately sedated, Olympus upper endoscope was advanced from mouth

and the second portion of the duodenum and retroflexion was performed in

the stomach.



Then, under endoscopic guidance under sterile condition, a 20-Sudanese

pull type of G-tube was successfully placed in epigastric area.  The

distance from the tip of the tube to skin was about 3 cm in size.  The

patient tolerated the procedure very well without any complication.



SUMMARY OF FINDINGS:  Status post successful PEG placement.



RECOMMENDATIONS:

1. Abdominal binder.

2. Elevate the head of the bed at all times.

3. G-tube flush.

4. G-tube care.

5. Start tube feeding later today.



I want to thank Dr. Holley for this kind referral.









  ______________________________________________

  Alexy Gupta M.D.





DR:  JUDY

D:  01/25/2019 10:26

T:  01/25/2019 15:10

JOB#:  [id number]

CC:  Patricia Holley M.D.; Fax#:  [phone]

## 2019-01-25 NOTE — NUR
NURSE NOTES:

At 1530, patient pulled off EKGs and attempted to pull out GT, stated doesn't need it. 
Educated pros and cons regarding EKGs and GT, patient continued to attempt to pull off. 
Informed MD Pradhan, new order soft wrist restraints.

## 2019-01-25 NOTE — IMMEDIATE POST-OP EVALUATION
Immediate Post-Op Evalulation


Immediate Post-Op Evalulation


Procedure:  egd/peg


Date of Evaluation:  Jan 25, 2019


Time of Evaluation:  10:45


IV Fluids:  100ml 0.9ns


Blood Products:  none


Estimated Blood Loss:  negligible


Blood Pressure Systolic:  102


Blood Pressure Diastolic:  39


Pulse Rate:  59


Respiratory Rate:  18


O2 Sat by Pulse Oximetry:  100


Temperature (Fahrenheit):  97.3


Pain Score (1-10):  0


Nausea:  No


Vomiting:  No


Complications


none


Patient Status:  awake, reacts, patent


Hydration Status:  adequate


Drug:  cefoxitin 1gm


Given Within 1 Hr of Incision:  Yes


Time Given:  10:12











Susanna Maguire MD Jan 25, 2019 10:47

## 2019-01-25 NOTE — NUR
NURSE NOTES:

MD Holley return call and provided orders that all medications be changed to GT route. 
Orders noted and carried out.

## 2019-01-25 NOTE — NUR
NURSE NOTES:

Restraints applied, provided education to patient with verbal understanding received. Will 
continue to monitor skin and assess patient.

## 2019-01-25 NOTE — NUR
NURSE NOTES:

Report received from Dinah RN/Xochilt RN. Pt is awake, alert, and oriented x1. Pt is on 
room air and breathing is even and unlabored. No acute distress noted. IV site is 
asymptomatic, patent, and intact. Bilateral soft wrists noted to be in place - peripheral 
pulses palpated, no swelling noted, pt has active range of motion. Bed placed in lowest 
position with brake engaged, side rails up x3, and bed alarm on. HD nurse currently at 
bedside. Will continue to monitor.

## 2019-01-25 NOTE — NUR
Received patient and report from [facility] shift nurse Gely, reported sinus Rhythm with AVB. 
Patient in bed awake, alert, and oriented x1-2 with padded side rails x 2 up with head of 
bed up and call light with in reach. No acute distress noted. No complaint of pain. Will 
continue to monitor.

## 2019-01-25 NOTE — GENERAL PROGRESS NOTE
Assessment/Plan


Problem List:  


(1) HTN (hypertension)


ICD Codes:  I10 - Essential (primary) hypertension


SNOMED:  10591878


(2) Hypothyroidism


ICD Codes:  E03.9 - Hypothyroidism, unspecified


SNOMED:  36868622


(3) ESRD (end stage renal disease)


ICD Codes:  N18.6 - End stage renal disease


SNOMED:  78654767


(4) PEG (percutaneous endoscopic gastrostomy) adjustment/replacement/removal


ICD Codes:  Z43.1 - Encounter for attention to gastrostomy


SNOMED:  002866298, 008237624


Assessment/Plan


plan PEG for today





Subjective


ROS Limited/Unobtainable:  No


Allergies:  


Coded Allergies:  


     No Known Allergies (Unverified , 3/20/18)





Objective





Last 24 Hour Vital Signs








  Date Time  Temp Pulse Resp B/P (MAP) Pulse Ox O2 Delivery O2 Flow Rate FiO2


 


1/25/19 08:00 97.1 64 20 149/69 (95) 91   


 


1/25/19 07:40  66      


 


1/25/19 04:00 97.1 63 18 125/50 (75) 99   


 


1/25/19 04:00  62      


 


1/25/19 00:00  62      


 


1/25/19 00:00 98.0 92 18 131/75 (93) 98   


 


1/24/19 21:00      Room Air  


 


1/24/19 20:39  69  142/60    


 


1/24/19 20:39    142/60    


 


1/24/19 20:00  67      


 


1/24/19 20:00 97.0 69 18 142/60 (87) 91   


 


1/24/19 17:21  61  128/48    


 


1/24/19 17:20    128/48    


 


1/24/19 16:00  66      


 


1/24/19 13:00    108/44    


 


1/24/19 12:00  62      


 


1/24/19 12:00 97.3 90 20 154/75 (101) 93   

















Intake and Output  


 


 1/24/19 1/25/19





 18:59 06:59


 


Intake Total 260 ml 240 ml


 


Balance 260 ml 240 ml


 


  


 


Intake Oral 260 ml 240 ml


 


# Voids 1 


 


# Bowel Movements 2 








Laboratory Tests


1/25/19 05:50: 


White Blood Count 7.3, Red Blood Count 5.89, Hemoglobin 16.0, Hematocrit 53.7H, 

Mean Corpuscular Volume 91, Mean Corpuscular Hemoglobin 27.1, Mean Corpuscular 

Hemoglobin Concent 29.8L, Red Cell Distribution Width 20.3H, Platelet Count 213

, Mean Platelet Volume 6.9, Neutrophils (%) (Auto) 57.0, Lymphocytes (%) (Auto) 

15.5L, Monocytes (%) (Auto) 8.1, Eosinophils (%) (Auto) 17.8H, Basophils (%) (

Auto) 1.6, Prothrombin Time 10.5, Prothromb Time International Ratio 1.0, 

Activated Partial Thromboplast Time 36H, Sodium Level 142, Potassium Level 5.3H

, Chloride Level 106, Carbon Dioxide Level 29, Anion Gap 7, Blood Urea Nitrogen 

34H, Creatinine 4.9H, Estimat Glomerular Filtration Rate , Glucose Level 128H, 

Calcium Level 9.7


Height (Feet):  5


Height (Inches):  8.00


Weight (Pounds):  159


General Appearance:  no apparent distress


EENT:  normal ENT inspection


Neck:  supple


Cardiovascular:  normal rate


Respiratory/Chest:  decreased breath sounds


Abdomen:  normal bowel sounds, non tender, soft


Extremities:  non-tender











Alexy Gupta MD Jan 25, 2019 10:11

## 2019-01-25 NOTE — ANETHESIA PREOPERATIVE EVAL
Anesthesia Pre-op PMH/ROS


General


Date of Evaluation:  Jan 25, 2019


Time of Evaluation:  07:59


Anesthesiologist:  juan r


ASA Score:  ASA 4


Mallampati Score


Class I : Soft palate, uvula, fauces, pillars visible


Class II: Soft palate, uvula, fauces visible


Class III: Soft palate, base of uvula visible


Class IV: Only hard plate visible


Mallampati Classification:  Class II


Surgeon:  jaymie


Diagnosis:  severe malnutrition


Surgical Procedure:  peg placement


Anesthesia History:  none


Family History:  no anesthesia problems


Allergies:  


Coded Allergies:  


     No Known Allergies (Unverified , 3/20/18)


Medications:  see eMAR


Patient NPO?:  Yes





Past Medical History


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


Gastrointestinal/Genitourinary:  Reports: GERD, ESRD - mwf, other - chronic 

pancreatitis


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 

seizure disorder


Endocrine:  Reports: hypothyroidism


Hematology/Immune:  Reports: anemia


Musculoskeletal/Integumentary:  Reports: DJD





Anesthesia Pre-op Phys. Exam


Physician Exam





Last Vital Signs








  Date Time  Temp Pulse Resp B/P (MAP) Pulse Ox O2 Delivery O2 Flow Rate FiO2


 


1/25/19 04:00 97.1 63 18 125/50 (75) 99   


 


1/24/19 21:00      Room Air  


 


1/23/19 20:04        97








Constitutional:  NAD


Neurologic:  CN 2-12 intact


Cardiovascular:  RRR


Respiratory:  CTA


Gastrointestinal:  S/NT/ND





Airway Exam


Mallampati Score:  Class II


MO:  limited


Neck:  flexible


TMD:  2fb


ROM:  limited


Teeth:  missing





Anesthesia Pre-op A/P


Labs





Hematology








Test


  1/25/19


05:50


 


White Blood Count


  7.3 K/UL


(4.8-10.8)


 


Red Blood Count


  5.89 M/UL


(4.70-6.10)


 


Hemoglobin


  16.0 G/DL


(14.2-18.0)


 


Hematocrit


  53.7 %


(42.0-52.0)  H


 


Mean Corpuscular Volume 91 FL (80-99)  


 


Mean Corpuscular Hemoglobin


  27.1 PG


(27.0-31.0)


 


Mean Corpuscular Hemoglobin


Concent 29.8 G/DL


(32.0-36.0)  L


 


Red Cell Distribution Width


  20.3 %


(11.6-14.8)  H


 


Platelet Count


  213 K/UL


(150-450)


 


Mean Platelet Volume


  6.9 FL


(6.5-10.1)


 


Neutrophils (%) (Auto)


  57.0 %


(45.0-75.0)


 


Lymphocytes (%) (Auto)


  15.5 %


(20.0-45.0)  L


 


Monocytes (%) (Auto)


  8.1 %


(1.0-10.0)


 


Eosinophils (%) (Auto)


  17.8 %


(0.0-3.0)  H


 


Basophils (%) (Auto)


  1.6 %


(0.0-2.0)








Coagulation








Test


  1/25/19


05:50


 


Prothrombin Time


  10.5 SEC


(9.30-11.50)


 


Prothromb Time International


Ratio 1.0 (0.9-1.1)  


 


 


Activated Partial


Thromboplast Time 36 SEC (23-33)


H








Chemistry








Test


  1/25/19


05:50


 


Sodium Level


  142 MMOL/L


(136-145)


 


Potassium Level


  5.3 MMOL/L


(3.5-5.1)  H


 


Chloride Level


  106 MMOL/L


()


 


Carbon Dioxide Level


  29 MMOL/L


(21-32)


 


Anion Gap


  7 mmol/L


(5-15)


 


Blood Urea Nitrogen


  34 mg/dL


(7-18)  H


 


Creatinine


  4.9 MG/DL


(0.55-1.30)  H


 


Estimat Glomerular Filtration


Rate  mL/min (>60)  


 


 


Glucose Level


  128 MG/DL


()  H


 


Calcium Level


  9.7 MG/DL


(8.5-10.1)











Risk Assessment & Plan


Assessment:


asa4.


Plan:


mac


Status Change Before Surgery:  No





Pre-Antibiotics


Drug:  cefoxitin 1gram


Given Within 1 Hr of Incision:  Yes


Time Given:  10:12











Susanna Maguire MD Jan 25, 2019 08:10

## 2019-01-26 VITALS — SYSTOLIC BLOOD PRESSURE: 146 MMHG | DIASTOLIC BLOOD PRESSURE: 50 MMHG

## 2019-01-26 VITALS — SYSTOLIC BLOOD PRESSURE: 97 MMHG | DIASTOLIC BLOOD PRESSURE: 45 MMHG

## 2019-01-26 VITALS — SYSTOLIC BLOOD PRESSURE: 144 MMHG | DIASTOLIC BLOOD PRESSURE: 57 MMHG

## 2019-01-26 VITALS — DIASTOLIC BLOOD PRESSURE: 46 MMHG | SYSTOLIC BLOOD PRESSURE: 133 MMHG

## 2019-01-26 VITALS — SYSTOLIC BLOOD PRESSURE: 121 MMHG | DIASTOLIC BLOOD PRESSURE: 50 MMHG

## 2019-01-26 VITALS — DIASTOLIC BLOOD PRESSURE: 44 MMHG | SYSTOLIC BLOOD PRESSURE: 139 MMHG

## 2019-01-26 LAB
ADD MANUAL DIFF: NO
ANION GAP SERPL CALC-SCNC: 7 MMOL/L (ref 5–15)
BASOPHILS NFR BLD AUTO: 0.5 % (ref 0–2)
BUN SERPL-MCNC: 29 MG/DL (ref 7–18)
CALCIUM SERPL-MCNC: 9.2 MG/DL (ref 8.5–10.1)
CHLORIDE SERPL-SCNC: 102 MMOL/L (ref 98–107)
CO2 SERPL-SCNC: 28 MMOL/L (ref 21–32)
CREAT SERPL-MCNC: 4 MG/DL (ref 0.55–1.3)
EOSINOPHIL NFR BLD AUTO: 8 % (ref 0–3)
ERYTHROCYTE [DISTWIDTH] IN BLOOD BY AUTOMATED COUNT: 19.7 % (ref 11.6–14.8)
HCT VFR BLD CALC: 46.6 % (ref 42–52)
HGB BLD-MCNC: 14.1 G/DL (ref 14.2–18)
LYMPHOCYTES NFR BLD AUTO: 10 % (ref 20–45)
MCV RBC AUTO: 90 FL (ref 80–99)
MONOCYTES NFR BLD AUTO: 6 % (ref 1–10)
NEUTROPHILS NFR BLD AUTO: 75.5 % (ref 45–75)
PLATELET # BLD: 204 K/UL (ref 150–450)
POTASSIUM SERPL-SCNC: 4.1 MMOL/L (ref 3.5–5.1)
RBC # BLD AUTO: 5.17 M/UL (ref 4.7–6.1)
SODIUM SERPL-SCNC: 137 MMOL/L (ref 136–145)
WBC # BLD AUTO: 9.3 K/UL (ref 4.8–10.8)

## 2019-01-26 RX ADMIN — HYDRALAZINE HYDROCHLORIDE SCH MG: 25 TABLET ORAL at 13:00

## 2019-01-26 RX ADMIN — MINOXIDIL SCH MG: 2.5 TABLET ORAL at 10:13

## 2019-01-26 RX ADMIN — LEVOTHYROXINE SODIUM SCH MCG: 125 TABLET ORAL at 06:17

## 2019-01-26 RX ADMIN — VITAMIN D, TAB 1000IU (100/BT) SCH INTLU: 25 TAB at 10:14

## 2019-01-26 RX ADMIN — LEVETIRACETAM SCH MG: 100 SOLUTION ORAL at 21:52

## 2019-01-26 RX ADMIN — ASPIRIN 81 MG SCH MG: 81 TABLET ORAL at 10:14

## 2019-01-26 RX ADMIN — TAMSULOSIN HYDROCHLORIDE SCH MG: 0.4 CAPSULE ORAL at 21:54

## 2019-01-26 RX ADMIN — DIVALPROEX SODIUM SCH MG: 125 CAPSULE ORAL at 14:00

## 2019-01-26 RX ADMIN — Medication SCH TAB: at 18:28

## 2019-01-26 RX ADMIN — DIVALPROEX SODIUM SCH MG: 125 CAPSULE ORAL at 21:53

## 2019-01-26 RX ADMIN — HYDRALAZINE HYDROCHLORIDE SCH MG: 25 TABLET ORAL at 21:54

## 2019-01-26 RX ADMIN — HYDRALAZINE HYDROCHLORIDE SCH MG: 25 TABLET ORAL at 18:28

## 2019-01-26 RX ADMIN — DIVALPROEX SODIUM SCH MG: 125 CAPSULE ORAL at 06:17

## 2019-01-26 RX ADMIN — HYDRALAZINE HYDROCHLORIDE SCH MG: 25 TABLET ORAL at 10:14

## 2019-01-26 RX ADMIN — HEPARIN SODIUM SCH UNITS: 5000 INJECTION INTRAVENOUS; SUBCUTANEOUS at 10:30

## 2019-01-26 RX ADMIN — HEPARIN SODIUM SCH UNITS: 5000 INJECTION INTRAVENOUS; SUBCUTANEOUS at 21:53

## 2019-01-26 RX ADMIN — Medication SCH TAB: at 10:15

## 2019-01-26 RX ADMIN — LEVETIRACETAM SCH MG: 100 SOLUTION ORAL at 10:14

## 2019-01-26 NOTE — DIAGNOSTIC IMAGING REPORT
EXAM:

  XR Right Foot, 2 Views

 

CLINICAL HISTORY:

  OSTEOMY

 

TECHNIQUE:

  Frontal and lateral views of the right foot.

 

COMPARISON:

  No relevant prior studies available.

 

FINDINGS:

  Bones/joints:  No acute fracture.  Osteopenia.  Ankylosis or coalition 

of the mid foot.  Osteopenia.  Posterior calcaneal spur.

  Soft tissues:  Soft tissue swelling.

  Vasculature:  Vascular calcifications.

 

IMPRESSION:     

  No acute fracture.

## 2019-01-26 NOTE — NUR
NURSE NOTES:

Received orders for DC to SNF: [facility] for  time of 1pm.

@12:30pm:  Gave report to MARCIN Celaya at [facility].

Transport arrived at 1pm.  When blood pressure was rechecked it 175 SBP.  Scheduled blood 
pressure medication Hydralazine by mouth was given.  After 45 minutes, the transporters 
states a Will Call and to call [phone] when blood pressure is lowered.  

@2:30pm  Blood pressure was 145/85.  Called [phone] dispatcher.  Estimated arrival at 
3:30pm

@3:30:  Transporters arrived and took patient to [facility].  Patient had blue blanket and 
upper denture.  All belongings of patient were sent to the SNF facility.  Patient ID band 
was removed.  Patient had no heart monitor on and no IV heplock.  



Patient left at 3:55pm.

-------------------------------------------------------------------------------

Addendum: 01/26/19 at 2014 by Brennan Rollins RN

-------------------------------------------------------------------------------

error, wrong patient.  JOSE

## 2019-01-26 NOTE — GENERAL PROGRESS NOTE
Assessment/Plan


Problem List:  


(1) HTN (hypertension)


ICD Codes:  I10 - Essential (primary) hypertension


SNOMED:  94941570


(2) Hypothyroidism


ICD Codes:  E03.9 - Hypothyroidism, unspecified


SNOMED:  52491395


(3) ESRD (end stage renal disease)


ICD Codes:  N18.6 - End stage renal disease


SNOMED:  61162883


(4) PEG (percutaneous endoscopic gastrostomy) adjustment/replacement/removal


ICD Codes:  Z43.1 - Encounter for attention to gastrostomy


SNOMED:  866725408, 435012780


Assessment/Plan


s/p GT placement yesterday


GTF


GT care


dc planning per primary team





Subjective


ROS Limited/Unobtainable:  No


Allergies:  


Coded Allergies:  


     No Known Allergies (Unverified , 3/20/18)





Objective





Last 24 Hour Vital Signs








  Date Time  Temp Pulse Resp B/P (MAP) Pulse Ox O2 Delivery O2 Flow Rate FiO2


 


1/26/19 04:00  63      


 


1/26/19 04:00 97.3 67 17 139/44 (75) 100   


 


1/26/19 00:00 97.0 69 17 121/50 (73) 100   


 


1/26/19 00:00  66      


 


1/25/19 22:22  86  130/56    


 


1/25/19 22:18    130/56    


 


1/25/19 22:17  86  130/56    


 


1/25/19 21:00      Room Air  


 


1/25/19 20:00  86      


 


1/25/19 20:00 97.0 96 18 139/56 (83) 99   


 


1/25/19 16:00 97.1 65 20 121/57 (78) 100   


 


1/25/19 16:00  68      


 


1/25/19 12:50    147/48    


 


1/25/19 12:00 97.3 61 20 150/57 (88) 99   


 


1/25/19 12:00  60      


 


1/25/19 10:53  59 18 147/48 99 Room Air  


 


1/25/19 10:49  59 18  100   


 


1/25/19 10:47  59 18  100   


 


1/25/19 10:43  59 18 148/50 100 Room Air  


 


1/25/19 10:38  59 18 117/38 100 Nasal Cannula 3 


 


1/25/19 10:33 97.3 59 18 102/36 100 Nasal Cannula 3 


 


1/25/19 09:00      Room Air  


 


1/25/19 08:00 97.1 64 20 149/69 (95) 91   


 


1/25/19 07:40  66      

















Intake and Output  


 


 1/25/19 1/26/19





 18:59 06:59


 


Intake Total 510 ml 


 


Balance 510 ml 


 


  


 


Intake Oral 110 ml 


 


Free Water 30 ml 


 


IV Total 260 ml 


 


Tube Feeding 110 ml 


 


# Voids  1


 


# Bowel Movements  1








Laboratory Tests


1/26/19 06:06: 


White Blood Count 9.3, Red Blood Count 5.17, Hemoglobin 14.1L, Hematocrit 46.6, 

Mean Corpuscular Volume 90, Mean Corpuscular Hemoglobin 27.3, Mean Corpuscular 

Hemoglobin Concent 30.3L, Red Cell Distribution Width 19.7H, Platelet Count 204

, Mean Platelet Volume 7.7, Neutrophils (%) (Auto) 75.5H, Lymphocytes (%) (Auto

) 10.0L, Monocytes (%) (Auto) 6.0, Eosinophils (%) (Auto) 8.0H, Basophils (%) (

Auto) 0.5, Sodium Level 137, Potassium Level 4.1, Chloride Level 102, Carbon 

Dioxide Level 28, Anion Gap 7, Blood Urea Nitrogen 29H, Creatinine 4.0H, 

Estimat Glomerular Filtration Rate , Glucose Level 207H, Calcium Level 9.2


Height (Feet):  5


Height (Inches):  8.00


Weight (Pounds):  159


General Appearance:  no apparent distress


EENT:  normal ENT inspection


Neck:  supple


Cardiovascular:  normal rate


Respiratory/Chest:  decreased breath sounds


Abdomen:  normal bowel sounds, non tender, soft


Extremities:  non-tender











Alexy Gupta MD Jan 26, 2019 07:38

## 2019-01-26 NOTE — NEPHROLOGY PROGRESS NOTE
Assessment/Plan


Plan


New PEG in. Dietary to recommend.


ESRD - HD to follow.


Expect DC tonight or early am.





Subjective


Subjective


Confused. Post PEG.





Objective


Objective





Last 24 Hour Vital Signs








  Date Time  Temp Pulse Resp B/P (MAP) Pulse Ox O2 Delivery O2 Flow Rate FiO2


 


1/26/19 12:00 97.6 64 18 97/45 (62) 98   


 


1/26/19 10:15  66  133/46    


 


1/26/19 10:14    133/46    


 


1/26/19 10:14  66  133/46    


 


1/26/19 10:13    133/46    


 


1/26/19 09:00      Room Air  


 


1/26/19 08:00 97.1 66 18 133/46 (75) 99   


 


1/26/19 07:41  65      


 


1/26/19 04:00  63      


 


1/26/19 04:00 97.3 67 17 139/44 (75) 100   


 


1/26/19 00:00 97.0 69 17 121/50 (73) 100   


 


1/26/19 00:00  66      


 


1/25/19 22:22  86  130/56    


 


1/25/19 22:18    130/56    


 


1/25/19 22:17  86  130/56    


 


1/25/19 21:00      Room Air  


 


1/25/19 20:00  86      


 


1/25/19 20:00 97.0 96 18 139/56 (83) 99   


 


1/25/19 16:00 97.1 65 20 121/57 (78) 100   


 


1/25/19 16:00  68      

















Intake and Output  


 


 1/25/19 1/26/19





 18:59 06:59


 


Intake Total 510 ml 


 


Balance 510 ml 


 


  


 


Intake Oral 110 ml 


 


Free Water 30 ml 


 


IV Total 260 ml 


 


Tube Feeding 110 ml 


 


# Voids  1


 


# Bowel Movements  1








Laboratory Tests


1/26/19 06:06: 


White Blood Count 9.3, Red Blood Count 5.17, Hemoglobin 14.1L, Hematocrit 46.6, 

Mean Corpuscular Volume 90, Mean Corpuscular Hemoglobin 27.3, Mean Corpuscular 

Hemoglobin Concent 30.3L, Red Cell Distribution Width 19.7H, Platelet Count 204

, Mean Platelet Volume 7.7, Neutrophils (%) (Auto) 75.5H, Lymphocytes (%) (Auto

) 10.0L, Monocytes (%) (Auto) 6.0, Eosinophils (%) (Auto) 8.0H, Basophils (%) (

Auto) 0.5, Erythrocyte Sedimentation Rate [Pending], Sodium Level 137, 

Potassium Level 4.1, Chloride Level 102, Carbon Dioxide Level 28, Anion Gap 7, 

Blood Urea Nitrogen 29H, Creatinine 4.0H, Estimat Glomerular Filtration Rate , 

Glucose Level 207H, Calcium Level 9.2, C-Reactive Protein, Quantitative [Pending

]


Height (Feet):  5


Height (Inches):  8.00


Weight (Pounds):  159


Objective


CV RR


Lungs CTA


Abd SNT. BS + Patricia Glynn MD Jan 26, 2019 13:43

## 2019-01-26 NOTE — CONSULTATION
History of Present Illness


General


Date patient seen:  Jan 26, 2019


Time patient seen:  01:00


Chief Complaint:  General Complaint


Referring physician:  MANUEL MARLOW


Reason for Consultation:  B/L Heel ulcerations





Present Illness


Allergies:  


Coded Allergies:  


     No Known Allergies (Unverified , 3/20/18)





Medication History


Scheduled


Amlodipine Besylate* (Amlodipine Besylate*), 5 MG PO BID, (Reported)


Aspirin* (Aspir 81*), 81 MG ORAL DAILY, (Reported)


Cholecalciferol (Vitamin D3)* (Vitamin D*), 1,000 UNIT ORAL DAILY, (Reported)


Divalproex Sodium* (Depakote Er*), 250 MG ORAL TID, (Reported)


Hydralazine Hcl* (Hydralazine Hcl*), 25 MG PO QID, (Reported)


Labetalol Hcl* (Normodyne*), 400 MG PO EVERY 12 HOURS, (Reported)


Levetiracetam* (Levetiracetam*), 500 MG PO BID, (Reported)


Levothyroxine Sodium* (Levothyroxine Sodium*), 125 MCG PO DAILY, (Reported)


Methoxy Peg-Epoetin Beta (Mircera), 75 MCG IJ Q2WKS ON 3RD HD, (Reported)


Minoxidil* (Loniten*), 5 MG PO DAILY, (Reported)


Tamsulosin Hcl (Tamsulosin Hcl*), 0.4 MG PO BEDTIME, (Reported)


Vitamin B Cmplx/Vit C/Folic AC (Nephro-Saadia Tablet), 1 TAB PO BID, (Reported)





Patient History


Healthcare decision maker


N


Resuscitation status





Advanced Directive on File








Physical Exam





Last 24 Hour Vital Signs








  Date Time  Temp Pulse Resp B/P (MAP) Pulse Ox O2 Delivery O2 Flow Rate FiO2


 


1/26/19 12:00 97.6 64 18 97/45 (62) 98   


 


1/26/19 10:15  66  133/46    


 


1/26/19 10:14    133/46    


 


1/26/19 10:14  66  133/46    


 


1/26/19 10:13    133/46    


 


1/26/19 09:00      Room Air  


 


1/26/19 08:00 97.1 66 18 133/46 (75) 99   


 


1/26/19 07:41  65      


 


1/26/19 04:00  63      


 


1/26/19 04:00 97.3 67 17 139/44 (75) 100   


 


1/26/19 00:00 97.0 69 17 121/50 (73) 100   


 


1/26/19 00:00  66      


 


1/25/19 22:22  86  130/56    


 


1/25/19 22:18    130/56    


 


1/25/19 22:17  86  130/56    


 


1/25/19 21:00      Room Air  


 


1/25/19 20:00  86      


 


1/25/19 20:00 97.0 96 18 139/56 (83) 99   


 


1/25/19 16:00 97.1 65 20 121/57 (78) 100   


 


1/25/19 16:00  68      

















Intake and Output  


 


 1/25/19 1/26/19





 18:59 06:59


 


Intake Total 510 ml 


 


Balance 510 ml 


 


  


 


Intake Oral 110 ml 


 


Free Water 30 ml 


 


IV Total 260 ml 


 


Tube Feeding 110 ml 


 


# Voids  1


 


# Bowel Movements  1











Laboratory Tests








Test


  1/26/19


06:06


 


White Blood Count


  9.3 K/UL


(4.8-10.8)


 


Red Blood Count


  5.17 M/UL


(4.70-6.10)


 


Hemoglobin


  14.1 G/DL


(14.2-18.0)  L


 


Hematocrit


  46.6 %


(42.0-52.0)


 


Mean Corpuscular Volume 90 FL (80-99)  


 


Mean Corpuscular Hemoglobin


  27.3 PG


(27.0-31.0)


 


Mean Corpuscular Hemoglobin


Concent 30.3 G/DL


(32.0-36.0)  L


 


Red Cell Distribution Width


  19.7 %


(11.6-14.8)  H


 


Platelet Count


  204 K/UL


(150-450)


 


Mean Platelet Volume


  7.7 FL


(6.5-10.1)


 


Neutrophils (%) (Auto)


  75.5 %


(45.0-75.0)  H


 


Lymphocytes (%) (Auto)


  10.0 %


(20.0-45.0)  L


 


Monocytes (%) (Auto)


  6.0 %


(1.0-10.0)


 


Eosinophils (%) (Auto)


  8.0 %


(0.0-3.0)  H


 


Basophils (%) (Auto)


  0.5 %


(0.0-2.0)


 


Sodium Level


  137 MMOL/L


(136-145)


 


Potassium Level


  4.1 MMOL/L


(3.5-5.1)


 


Chloride Level


  102 MMOL/L


()


 


Carbon Dioxide Level


  28 MMOL/L


(21-32)


 


Anion Gap


  7 mmol/L


(5-15)


 


Blood Urea Nitrogen


  29 mg/dL


(7-18)  H


 


Creatinine


  4.0 MG/DL


(0.55-1.30)  H


 


Estimat Glomerular Filtration


Rate  mL/min (>60)  


 


 


Glucose Level


  207 MG/DL


()  H


 


Calcium Level


  9.2 MG/DL


(8.5-10.1)








Height (Feet):  5


Height (Inches):  8.00


Weight (Pounds):  159


Medications





Current Medications








 Medications


  (Trade)  Dose


 Ordered  Sig/Simon


 Route


 PRN Reason  Start Time


 Stop Time Status Last Admin


Dose Admin


 


 Amlodipine


 Besylate


  (Norvasc)  5 mg  Q12HR


 GT


   1/25/19 21:00


 2/24/19 20:59  1/26/19 10:14


 


 


 Aspirin


  (ASA)  81 mg  DAILY


 GT


   1/26/19 09:00


 2/25/19 08:59  1/26/19 10:14


 


 


 Divalproex Sodium


  (Depakote


 Sprinkles)  250 mg  Q8HR


 GT


   1/25/19 22:00


 2/24/19 21:59  1/26/19 06:17


 


 


 Heparin Sodium


  (Porcine)


  (Heparin 5000


 units/ml)  5,000 units  EVERY 12  HOURS


 SUBQ


   1/24/19 09:00


 2/23/19 08:59  1/26/19 10:30


 


 


 Hydralazine HCl


  (Apresoline)  25 mg  QID


 GT


   1/25/19 21:00


 2/22/19 22:59  1/26/19 10:14


 


 


 Labetalol HCl


  (Normodyne)  400 mg  EVERY 12  HOURS


 GT


   1/25/19 21:00


 2/22/19 22:59  1/26/19 10:15


 


 


 Levetiracetam


  (Keppra)  500 mg  Q12HR


 GT


   1/25/19 21:00


 2/24/19 20:59  1/26/19 10:14


 


 


 Levothyroxine


 Sodium


  (Synthroid)  125 mcg  DAILY@0630


 GT


   1/26/19 06:30


 2/23/19 06:29  1/26/19 06:17


 


 


 Minoxidil


  (Loniten)  5 mg  DAILY


 GT


   1/26/19 09:00


 2/23/19 08:59  1/26/19 10:13


 


 


 Tamsulosin HCl


  (Flomax)  0.4 mg  BEDTIME


 ORAL


   1/23/19 23:00


 2/22/19 22:59  1/24/19 20:39


 


 


 Vitamin B Complex/


 Vit C/Folic Acid


  (Nephrovite)  1 tab  BID


 GT


   1/26/19 09:00


 2/23/19 08:59  1/26/19 10:15


 


 


 Vitamin D


  (Vitamin D)  1,000 intlu  DAILY


 GT


   1/26/19 09:00


 2/23/19 08:59  1/26/19 10:14


 








Objective Narrative


Focused: B/L LE exam:





- Right foot: +1/4 DP/PT pulses. Plantar medial heel ulceration is noted, 

approx 3.0cm x 3.0 cm  with overlying eschar, UTD depth of wound, (-) active 

purulent drainage, normal temp differential, (-) mal-odor


- Left foot: +1/4 DP/PT pulses. Plantar medial heel ulceration is noted, approx 

3.5cm x 3.5cm  with overlying eschar, UTD depth of wound, (-) active purulent 

drainage, normal temp differential, (-) mal-odor





Assessment/Plan


Assessment/Plan


A: 


- B/L Heel ulcerations, stable. 


- S/p GT placement


- DM


- ESRD








P:


- Pt seen and evaluated. 


- chart reviewed. 


- Temp, 97.6


- WBC, 9.3 


- ESR, CRP pending. 


- B/L Foot and MRI pending. 


- Arterial U/S pending. 


- Daily wound care order submitted. 


- B/L LE Pravalon boots. 


- IV ABx. 


- No acute surgical intervention required at this time. Will await official 

imaging studies. 


- Podiatry will cont to monitor.











Kobi Dukes DPM Jan 26, 2019 13:30

## 2019-01-26 NOTE — SURGERY PROGRESS NOTE
Surgery Progress Note


Subjective


Additional Comments


s/p PEG.  doing well.  pending HD.





Objective





Last 24 Hour Vital Signs








  Date Time  Temp Pulse Resp B/P (MAP) Pulse Ox O2 Delivery O2 Flow Rate FiO2


 


1/26/19 13:00    97/45    


 


1/26/19 12:00 97.6 64 18 97/45 (62) 98   


 


1/26/19 11:45  62      


 


1/26/19 10:15  66  133/46    


 


1/26/19 10:14    133/46    


 


1/26/19 10:14  66  133/46    


 


1/26/19 10:13    133/46    


 


1/26/19 09:00      Room Air  


 


1/26/19 08:00 97.1 66 18 133/46 (75) 99   


 


1/26/19 07:41  65      


 


1/26/19 04:00  63      


 


1/26/19 04:00 97.3 67 17 139/44 (75) 100   


 


1/26/19 00:00 97.0 69 17 121/50 (73) 100   


 


1/26/19 00:00  66      


 


1/25/19 22:22  86  130/56    


 


1/25/19 22:18    130/56    


 


1/25/19 22:17  86  130/56    


 


1/25/19 21:00      Room Air  


 


1/25/19 20:00  86      


 


1/25/19 20:00 97.0 96 18 139/56 (83) 99   








I&O











Intake and Output  


 


 1/25/19 1/26/19





 19:00 07:00


 


Intake Total 510 ml 


 


Balance 510 ml 


 


  


 


Intake Oral 110 ml 


 


Free Water 30 ml 


 


IV Total 260 ml 


 


Tube Feeding 110 ml 


 


# Voids  1


 


# Bowel Movements  1








Dressing:  other


Wound:  other


Drains:  other


Cardiovascular:  RSR


Respiratory:  clear


Abdomen:  soft, present bowel sounds, other, non-distended


Extremities:  other





Laboratory Tests








Test


  1/26/19


06:06


 


White Blood Count


  9.3 K/UL


(4.8-10.8)


 


Red Blood Count


  5.17 M/UL


(4.70-6.10)


 


Hemoglobin


  14.1 G/DL


(14.2-18.0)  L


 


Hematocrit


  46.6 %


(42.0-52.0)


 


Mean Corpuscular Volume 90 FL (80-99)  


 


Mean Corpuscular Hemoglobin


  27.3 PG


(27.0-31.0)


 


Mean Corpuscular Hemoglobin


Concent 30.3 G/DL


(32.0-36.0)  L


 


Red Cell Distribution Width


  19.7 %


(11.6-14.8)  H


 


Platelet Count


  204 K/UL


(150-450)


 


Mean Platelet Volume


  7.7 FL


(6.5-10.1)


 


Neutrophils (%) (Auto)


  75.5 %


(45.0-75.0)  H


 


Lymphocytes (%) (Auto)


  10.0 %


(20.0-45.0)  L


 


Monocytes (%) (Auto)


  6.0 %


(1.0-10.0)


 


Eosinophils (%) (Auto)


  8.0 %


(0.0-3.0)  H


 


Basophils (%) (Auto)


  0.5 %


(0.0-2.0)


 


Erythrocyte Sedimentation Rate


  42 MM/HR


(0-20)  H


 


Sodium Level


  137 MMOL/L


(136-145)


 


Potassium Level


  4.1 MMOL/L


(3.5-5.1)


 


Chloride Level


  102 MMOL/L


()


 


Carbon Dioxide Level


  28 MMOL/L


(21-32)


 


Anion Gap


  7 mmol/L


(5-15)


 


Blood Urea Nitrogen


  29 mg/dL


(7-18)  H


 


Creatinine


  4.0 MG/DL


(0.55-1.30)  H


 


Estimat Glomerular Filtration


Rate  mL/min (>60)  


 


 


Glucose Level


  207 MG/DL


()  H


 


Calcium Level


  9.2 MG/DL


(8.5-10.1)


 


C-Reactive Protein,


Quantitative 12.7 mg/dL


(0.00-0.90)  H











Plan


Problems:  


(1) Severe malnutrition


Assessment & Plan:  Failure to thrive resulting in loss of weight, weakness, 

and now developing wounds





DTPI to R heel .Wound bed black with red tinged borders and is fluctuant(L)

3.6cm x(W)5.4cm.Hyperpigmentation periwound. Stable dry eschar L heel (L)2.6cm 

x (W)3.8cm.Hyperpigmented skin periwound.Sacrum is intact without redness but 

pt verbalized tenderness when sacrococcygeal area palpated.Moisture barrier 

applied and covered with Optifoam drsg.





s/p PEG





Nutritional optimization necessary to help with wound healing


Swab R and L heels with Betadine.Cover with Optifoam Daily and prn.


Off-load Heels with Pillow.


Apply Moisture Barrier to Sacrum. Cover with Optifoam drsg.Change every 7 days 

and prn.


Reposition at least every 2hours or as tolerated.


Support surface mattress.


thank you will follow with Grant Brar Jan 26, 2019 18:18

## 2019-01-26 NOTE — NUR
NURSE NOTES:

Report received from Brennan BURCH. Pt is awake, alert, and oriented x1. Pt is on room air and 
breathing is even and unlabored. No acute distress noted. IV site is asymptomatic, patent, 
and intact.Bed placed in lowest position with brake engaged, side rails up x2, and bed alarm 
on. Will continue to monitor and follow plan of care.

## 2019-01-26 NOTE — DIAGNOSTIC IMAGING REPORT
EXAM:

  XR Left Foot, 2 Views

 

CLINICAL HISTORY:

  OSTEOMY

 

TECHNIQUE:

  Frontal and lateral views of the left foot.

 

COMPARISON:

  No relevant prior studies available.

 

FINDINGS:

  Bones/joints:  No acute fracture.  Plantar and posterior calcaneal 

spurs.  Osteopenia.  Probable old fractures of the third and fourth 

proximal phalanges.  Possibly coalition or ankylosis in the mid foot 

region.

  Soft tissues:  Soft tissue edema.

  Vasculature:  Vascular calcifications.

 

IMPRESSION:     

  No acute fracture.

## 2019-01-26 NOTE — NUR
NURSE NOTES:

@16:00 Made call to JORGE Ruelas for discharge planning.  Made charge nurse aware of discharge 
plan to [facility].  

@18:00 Dressing changes done and  abdominal dressing change done.  Patient had pericare done 
with Nepro running at 40ml/hr.  Patient tolerate feeding with no residual. Bowel Sounds 
present in 4 quadrants.



@1900 No new updates on discharge plan.  Endorse to night shift nurse.

## 2019-01-26 NOTE — NUR
HAND-OFF: 

Report given to Brennan HOUSTON RN. Pt is resting in bed in stable condition. No acute distress 
noted. Endorsed plan of care.

-------------------------------------------------------------------------------

Addendum: 01/26/19 at 3448 by VERÓNICA NOVAK RN

-------------------------------------------------------------------------------

Endorsed to AM shift that we are unable to administer Flomax via GT and to notify MD Holley and request alternative order. Brennan BURCH verbalized understanding. atorvastatin (LIPITOR) 40 MG tablet     Last Written Prescription Date: 6/12/17  Last Fill Quantity: 90, # refills: 0  Last Office Visit with [facility] or [facility] prescribing provider: 6/19/17       Lab Results   Component Value Date    CHOL 161 06/19/2017     Lab Results   Component Value Date    HDL 39 06/19/2017     Lab Results   Component Value Date    LDL 88 06/19/2017     Lab Results   Component Value Date    TRIG 171 06/19/2017     Lab Results   Component Value Date    CHOLHDLRATIO 5.2 07/02/2015     levothyroxine (SYNTHROID/LEVOTHROID) 137 MCG tablet    Last Written Prescription Date: 6/12/17  Last Quantity: 90, # refills: 0  Last Office Visit with [facility], [facility] or [facility] prescribing provider: 6/19/17        TSH   Date Value Ref Range Status   06/19/2017 1.38 0.40 - 4.00 mU/L Final     lisinopril-hydrochlorothiazide (PRINZIDE/ZESTORETIC) 20-25 MG per tablet      Last Written Prescription Date: 6/12/17  Last Fill Quantity: 90, # refills: 0  Last Office Visit with [facility], [facility] or  Health prescribing provider: 6/19/17       Potassium   Date Value Ref Range Status   06/19/2017 4.5 3.4 - 5.3 mmol/L Final     Creatinine   Date Value Ref Range Status   06/19/2017 1.18 (H) 0.52 - 1.04 mg/dL Final     BP Readings from Last 3 Encounters:   06/19/17 110/68   07/06/16 139/80   02/10/16 123/65

## 2019-01-26 NOTE — NUR
NURSE NOTES:

Report received from MARCIN Willett.  Pt is asleep. Pt is on room air and breathing is even 
and unlabored. No acute distress noted. IV site is asymptomatic, patent, and intact. 
Bilateral soft wrists noted to be in place - peripheral pulses palpated, no swelling noted, 
pt has active range of motion. Bed placed in lowest position with brake engaged, side rails 
up x3, and bed alarm on. Patient is on Nepro feeding @ 40ml/hr.  Patient's Gt is intact.  
Patient has heel protectors on.  Will continue to monitor. 

Paged Dr. Negron about Flomax adjustment to Gtube @8:30am

## 2019-01-27 VITALS — DIASTOLIC BLOOD PRESSURE: 49 MMHG | SYSTOLIC BLOOD PRESSURE: 128 MMHG

## 2019-01-27 VITALS — SYSTOLIC BLOOD PRESSURE: 142 MMHG | DIASTOLIC BLOOD PRESSURE: 53 MMHG

## 2019-01-27 VITALS — SYSTOLIC BLOOD PRESSURE: 106 MMHG | DIASTOLIC BLOOD PRESSURE: 47 MMHG

## 2019-01-27 VITALS — DIASTOLIC BLOOD PRESSURE: 45 MMHG | SYSTOLIC BLOOD PRESSURE: 100 MMHG

## 2019-01-27 VITALS — DIASTOLIC BLOOD PRESSURE: 47 MMHG | SYSTOLIC BLOOD PRESSURE: 132 MMHG

## 2019-01-27 VITALS — DIASTOLIC BLOOD PRESSURE: 44 MMHG | SYSTOLIC BLOOD PRESSURE: 139 MMHG

## 2019-01-27 RX ADMIN — HYDRALAZINE HYDROCHLORIDE SCH MG: 25 TABLET ORAL at 22:37

## 2019-01-27 RX ADMIN — Medication SCH TAB: at 18:40

## 2019-01-27 RX ADMIN — ASPIRIN 81 MG SCH MG: 81 TABLET ORAL at 09:44

## 2019-01-27 RX ADMIN — LEVETIRACETAM SCH MG: 100 SOLUTION ORAL at 09:44

## 2019-01-27 RX ADMIN — TERAZOSIN HYDROCHLORIDE SCH MG: 1 CAPSULE ORAL at 09:44

## 2019-01-27 RX ADMIN — DIVALPROEX SODIUM SCH MG: 125 CAPSULE ORAL at 22:36

## 2019-01-27 RX ADMIN — DIVALPROEX SODIUM SCH MG: 125 CAPSULE ORAL at 05:39

## 2019-01-27 RX ADMIN — VITAMIN D, TAB 1000IU (100/BT) SCH INTLU: 25 TAB at 09:50

## 2019-01-27 RX ADMIN — LEVETIRACETAM SCH MG: 100 SOLUTION ORAL at 22:35

## 2019-01-27 RX ADMIN — Medication SCH TAB: at 09:44

## 2019-01-27 RX ADMIN — MINOXIDIL SCH MG: 2.5 TABLET ORAL at 09:44

## 2019-01-27 RX ADMIN — LEVOTHYROXINE SODIUM SCH MCG: 125 TABLET ORAL at 05:39

## 2019-01-27 RX ADMIN — HEPARIN SODIUM SCH UNITS: 5000 INJECTION INTRAVENOUS; SUBCUTANEOUS at 09:53

## 2019-01-27 RX ADMIN — HYDRALAZINE HYDROCHLORIDE SCH MG: 25 TABLET ORAL at 13:00

## 2019-01-27 RX ADMIN — TAMSULOSIN HYDROCHLORIDE SCH MG: 0.4 CAPSULE ORAL at 22:36

## 2019-01-27 RX ADMIN — DIVALPROEX SODIUM SCH MG: 125 CAPSULE ORAL at 13:46

## 2019-01-27 RX ADMIN — HYDRALAZINE HYDROCHLORIDE SCH MG: 25 TABLET ORAL at 18:40

## 2019-01-27 RX ADMIN — HEPARIN SODIUM SCH UNITS: 5000 INJECTION INTRAVENOUS; SUBCUTANEOUS at 22:38

## 2019-01-27 RX ADMIN — HYDRALAZINE HYDROCHLORIDE SCH MG: 25 TABLET ORAL at 09:44

## 2019-01-27 NOTE — NEPHROLOGY PROGRESS NOTE
Assessment/Plan


Plan


New PEG in. Dietary to recommend.


ESRD - HD to follow.


HD tomorrow then DC





Subjective


Subjective


Confused. Post PEG. Patient not DC'ed to SNF. Not accepting during the weekend.





Objective


Objective





Last 24 Hour Vital Signs








  Date Time  Temp Pulse Resp B/P (MAP) Pulse Ox O2 Delivery O2 Flow Rate FiO2


 


1/27/19 09:50  72  142/53    


 


1/27/19 09:44    142/53    


 


1/27/19 09:44    142/53    


 


1/27/19 09:44  72  142/53    


 


1/27/19 08:00 97.5 72 18 142/53 (82) 100   


 


1/27/19 04:00  63      


 


1/27/19 04:00 96.4 63 18 128/49 (75) 100   


 


1/27/19 00:00  58      


 


1/27/19 00:00 97.6 64 18 106/47 (66) 100   


 


1/26/19 21:54  62  146/50    


 


1/26/19 21:54    146/50    


 


1/26/19 21:54  62  146/50    


 


1/26/19 21:00      Room Air  


 


1/26/19 20:00 97.4 62 18 146/50 (82) 98   


 


1/26/19 20:00  64      


 


1/26/19 18:28    144/57    


 


1/26/19 18:20  65  144/57 (86)    


 


1/26/19 13:00    97/45    

















Intake and Output  


 


 1/26/19 1/27/19





 19:00 07:00


 


  


 


# Voids 2 1








Height (Feet):  5


Height (Inches):  8.00


Weight (Pounds):  146


Objective


CV RR


Lungs CTA


Abd SNT. BS + New PEG











Patricia Holley MD Jan 27, 2019 12:10

## 2019-01-27 NOTE — CARDIOLOGY REPORT
--------------- APPROVED REPORT --------------





EKG Measurement

Heart [id number]

ND [id number]

[id number]

[id number]

[id number]





Sinus rhythm with 1st degree AV block

Septal infarct, age undetermined

Abnormal ECG

## 2019-01-27 NOTE — NUR
NURSE NOTES:



Received report from NATALIIA Rollins RN. Patient is asleep in bed with no s/s of acute distress 
noted. A/O x1. Sinus rhythm with 1st degree AVB noted on cardiac monitor. Saturating well on 
room air. Receiving Nepro @ 40 cc/hr via GT and tolerating well. Right AC 20g IV saline 
lock, intact and patent. Left upper arm AV shunt noted. Bed locked in lowest position with 
side rails up x3. Call light left within reach. Will continue to monitor.

## 2019-01-27 NOTE — GENERAL PROGRESS NOTE
Assessment/Plan


Problem List:  


(1) HTN (hypertension)


ICD Codes:  I10 - Essential (primary) hypertension


SNOMED:  89297050


(2) Hypothyroidism


ICD Codes:  E03.9 - Hypothyroidism, unspecified


SNOMED:  87203187


(3) ESRD (end stage renal disease)


ICD Codes:  N18.6 - End stage renal disease


SNOMED:  05018136


(4) PEG (percutaneous endoscopic gastrostomy) adjustment/replacement/removal


ICD Codes:  Z43.1 - Encounter for attention to gastrostomy


SNOMED:  403821727, 545229527


Assessment/Plan


s/p GT placement 


GTF


GT care


dc planning per primary team





Subjective


ROS Limited/Unobtainable:  No


Allergies:  


Coded Allergies:  


     No Known Allergies (Unverified , 3/20/18)





Objective





Last 24 Hour Vital Signs








  Date Time  Temp Pulse Resp B/P (MAP) Pulse Ox O2 Delivery O2 Flow Rate FiO2


 


1/27/19 04:00  63      


 


1/27/19 04:00 96.4 63 18 128/49 (75) 100   


 


1/27/19 00:00  58      


 


1/27/19 00:00 97.6 64 18 106/47 (66) 100   


 


1/26/19 21:54  62  146/50    


 


1/26/19 21:54    146/50    


 


1/26/19 21:54  62  146/50    


 


1/26/19 21:00      Room Air  


 


1/26/19 20:00 97.4 62 18 146/50 (82) 98   


 


1/26/19 20:00  64      


 


1/26/19 18:28    144/57    


 


1/26/19 18:20  65  144/57 (86)    


 


1/26/19 13:00    97/45    


 


1/26/19 12:00 97.6 64 18 97/45 (62) 98   


 


1/26/19 11:45  62      


 


1/26/19 10:15  66  133/46    


 


1/26/19 10:14    133/46    


 


1/26/19 10:14  66  133/46    


 


1/26/19 10:13    133/46    


 


1/26/19 09:00      Room Air  


 


1/26/19 08:00 97.1 66 18 133/46 (75) 99   

















Intake and Output  


 


 1/26/19 1/27/19





 19:00 07:00


 


  


 


# Voids 2 1








Height (Feet):  5


Height (Inches):  8.00


Weight (Pounds):  146


General Appearance:  no apparent distress


EENT:  normal ENT inspection


Neck:  supple


Cardiovascular:  normal rate


Respiratory/Chest:  decreased breath sounds


Abdomen:  normal bowel sounds, non tender, soft


Extremities:  non-tender











Alexy Gupta MD Jan 27, 2019 08:00

## 2019-01-27 NOTE — NUR
NURSE NOTES:



Report received from Yanira BURCH. Pt is AAO x2. Pt is on room air and breathing is even and 
unlabored. No acute distress noted.  Patient denies pain. IV site is asymptomatic, patent, 
and intact.  Bed placed in lowest position with brake engaged, side rails up x2, and bed 
alarm on.  Will continue to monitor and follow plan of care.

## 2019-01-28 VITALS — DIASTOLIC BLOOD PRESSURE: 63 MMHG | SYSTOLIC BLOOD PRESSURE: 147 MMHG

## 2019-01-28 VITALS — DIASTOLIC BLOOD PRESSURE: 44 MMHG | SYSTOLIC BLOOD PRESSURE: 134 MMHG

## 2019-01-28 VITALS — DIASTOLIC BLOOD PRESSURE: 40 MMHG | SYSTOLIC BLOOD PRESSURE: 124 MMHG

## 2019-01-28 VITALS — DIASTOLIC BLOOD PRESSURE: 45 MMHG | SYSTOLIC BLOOD PRESSURE: 143 MMHG

## 2019-01-28 VITALS — DIASTOLIC BLOOD PRESSURE: 59 MMHG | SYSTOLIC BLOOD PRESSURE: 159 MMHG

## 2019-01-28 VITALS — SYSTOLIC BLOOD PRESSURE: 120 MMHG | DIASTOLIC BLOOD PRESSURE: 44 MMHG

## 2019-01-28 LAB
ADD MANUAL DIFF: NO
ANION GAP SERPL CALC-SCNC: 7 MMOL/L (ref 5–15)
BASOPHILS NFR BLD AUTO: 0.6 % (ref 0–2)
BUN SERPL-MCNC: 49 MG/DL (ref 7–18)
CALCIUM SERPL-MCNC: 9.2 MG/DL (ref 8.5–10.1)
CHLORIDE SERPL-SCNC: 101 MMOL/L (ref 98–107)
CO2 SERPL-SCNC: 28 MMOL/L (ref 21–32)
CREAT SERPL-MCNC: 5.9 MG/DL (ref 0.55–1.3)
EOSINOPHIL NFR BLD AUTO: 14.3 % (ref 0–3)
ERYTHROCYTE [DISTWIDTH] IN BLOOD BY AUTOMATED COUNT: 19.4 % (ref 11.6–14.8)
HCT VFR BLD CALC: 41.9 % (ref 42–52)
HGB BLD-MCNC: 13 G/DL (ref 14.2–18)
LYMPHOCYTES NFR BLD AUTO: 11.8 % (ref 20–45)
MCV RBC AUTO: 89 FL (ref 80–99)
MONOCYTES NFR BLD AUTO: 9.4 % (ref 1–10)
NEUTROPHILS NFR BLD AUTO: 63.9 % (ref 45–75)
PLATELET # BLD: 215 K/UL (ref 150–450)
POTASSIUM SERPL-SCNC: 4.3 MMOL/L (ref 3.5–5.1)
RBC # BLD AUTO: 4.7 M/UL (ref 4.7–6.1)
SODIUM SERPL-SCNC: 136 MMOL/L (ref 136–145)
WBC # BLD AUTO: 8.3 K/UL (ref 4.8–10.8)

## 2019-01-28 RX ADMIN — Medication SCH TAB: at 18:59

## 2019-01-28 RX ADMIN — DIVALPROEX SODIUM SCH MG: 125 CAPSULE ORAL at 14:09

## 2019-01-28 RX ADMIN — DIVALPROEX SODIUM SCH MG: 125 CAPSULE ORAL at 21:39

## 2019-01-28 RX ADMIN — LEVETIRACETAM SCH MG: 100 SOLUTION ORAL at 10:16

## 2019-01-28 RX ADMIN — ASPIRIN 81 MG SCH MG: 81 TABLET ORAL at 09:00

## 2019-01-28 RX ADMIN — MINOXIDIL SCH MG: 2.5 TABLET ORAL at 09:00

## 2019-01-28 RX ADMIN — LEVOTHYROXINE SODIUM SCH MCG: 125 TABLET ORAL at 06:25

## 2019-01-28 RX ADMIN — HYDRALAZINE HYDROCHLORIDE SCH MG: 25 TABLET ORAL at 13:00

## 2019-01-28 RX ADMIN — HYDRALAZINE HYDROCHLORIDE SCH MG: 25 TABLET ORAL at 21:40

## 2019-01-28 RX ADMIN — HYDRALAZINE HYDROCHLORIDE SCH MG: 25 TABLET ORAL at 18:59

## 2019-01-28 RX ADMIN — HEPARIN SODIUM SCH UNITS: 5000 INJECTION INTRAVENOUS; SUBCUTANEOUS at 09:00

## 2019-01-28 RX ADMIN — LEVETIRACETAM SCH MG: 100 SOLUTION ORAL at 21:00

## 2019-01-28 RX ADMIN — DIVALPROEX SODIUM SCH MG: 125 CAPSULE ORAL at 06:21

## 2019-01-28 RX ADMIN — HYDRALAZINE HYDROCHLORIDE SCH MG: 25 TABLET ORAL at 09:00

## 2019-01-28 RX ADMIN — HEPARIN SODIUM SCH UNITS: 5000 INJECTION INTRAVENOUS; SUBCUTANEOUS at 21:42

## 2019-01-28 RX ADMIN — TERAZOSIN HYDROCHLORIDE SCH MG: 1 CAPSULE ORAL at 09:00

## 2019-01-28 RX ADMIN — Medication SCH TAB: at 10:16

## 2019-01-28 RX ADMIN — VITAMIN D, TAB 1000IU (100/BT) SCH INTLU: 25 TAB at 10:16

## 2019-01-28 RX ADMIN — TAMSULOSIN HYDROCHLORIDE SCH MG: 0.4 CAPSULE ORAL at 21:39

## 2019-01-28 NOTE — NUR
HAND-OFF: 

Report given to Eleonora BURCH. Pt is asleep in bed in stable condition. Endorsed plan of care.

## 2019-01-28 NOTE — GI PROGRESS NOTE
Assessment/Plan


Problems:  


(1) Severe malnutrition


ICD Codes:  E43 - Unspecified severe protein-calorie malnutrition


SNOMED:  15638199


(2) GERD (gastroesophageal reflux disease)


ICD Codes:  K21.9 - Gastro-esophageal reflux disease without esophagitis


SNOMED:  289846081


(3) Dementia


ICD Codes:  F03.90 - Unspecified dementia without behavioral disturbance


SNOMED:  41577286


(4) Anemia


ICD Codes:  D64.9 - Anemia, unspecified


SNOMED:  037479890


(5) Constipation


ICD Codes:  K59.00 - Constipation, unspecified


SNOMED:  36274306


(6) Encounter for generalized patient complaints


ICD Codes:  Z00.8 - Encounter for other general examination


SNOMED:  938255822


(7) Dehydration


ICD Codes:  E86.0 - Dehydration


SNOMED:  04028272


(8) PEG (percutaneous endoscopic gastrostomy) adjustment/replacement/removal


ICD Codes:  Z43.1 - Encounter for attention to gastrostomy


SNOMED:  218657574, 711945418


Status:  stable


Status Narrative


Discussed with Dr. Gupta.


Assessment/Plan


s/p GT placement 


GTF per RD to goal


GT care


bowel regime


dc planning per primary team





The patient was seen and examined at bedside and all new and available data was 

reviewed in the patients chart. I agree with the above findings, impression 

and plan.  (Patient seen earlier today. Signature stamp does not reflect 

patient encounter time.). - Alexy Gupta MD





Subjective


Gastrointestinal/Abdominal:  Reports: no symptoms





Objective





Last 24 Hour Vital Signs








  Date Time  Temp Pulse Resp B/P (MAP) Pulse Ox O2 Delivery O2 Flow Rate FiO2


 


1/28/19 09:00      Room Air  


 


1/28/19 08:00 97.8 68 18 134/44 (74) 98   


 


1/28/19 08:00  67      


 


1/28/19 04:00 98.6 71 18 143/45 (77) 98   


 


1/28/19 03:53  68      


 


1/28/19 00:00 98.9 67 18 124/40 (68) 98   


 


1/27/19 23:53  68      


 


1/27/19 22:37    139/44    


 


1/27/19 22:37  71  139/44    


 


1/27/19 22:36  71  139/44    


 


1/27/19 21:00      Room Air  


 


1/27/19 20:01  71      


 


1/27/19 20:00 98.6 71 18 139/44 (75) 99   


 


1/27/19 18:40    132/47    


 


1/27/19 15:59 98.0 65 18 132/47 (75) 100   


 


1/27/19 15:21  64      


 


1/27/19 13:00    100/45    


 


1/27/19 12:00 98.3 59 18 100/45 (63) 100   


 


1/27/19 11:33  60      

















Intake and Output  


 


 1/27/19 1/28/19





 19:00 07:00


 


Intake Total 40 ml 360 ml


 


Output Total  0 ml


 


Balance 40 ml 360 ml


 


  


 


Free Water  40 ml


 


Tube Feeding 40 ml 320 ml


 


Output Urine Total  0 ml


 


# Voids 1 1











Laboratory Tests








Test


  1/28/19


06:10


 


White Blood Count


  8.3 K/UL


(4.8-10.8)


 


Red Blood Count


  4.70 M/UL


(4.70-6.10)


 


Hemoglobin


  13.0 G/DL


(14.2-18.0)  L


 


Hematocrit


  41.9 %


(42.0-52.0)  L


 


Mean Corpuscular Volume 89 FL (80-99)  


 


Mean Corpuscular Hemoglobin


  27.6 PG


(27.0-31.0)


 


Mean Corpuscular Hemoglobin


Concent 31.0 G/DL


(32.0-36.0)  L


 


Red Cell Distribution Width


  19.4 %


(11.6-14.8)  H


 


Platelet Count


  215 K/UL


(150-450)


 


Mean Platelet Volume


  7.6 FL


(6.5-10.1)


 


Neutrophils (%) (Auto)


  63.9 %


(45.0-75.0)


 


Lymphocytes (%) (Auto)


  11.8 %


(20.0-45.0)  L


 


Monocytes (%) (Auto)


  9.4 %


(1.0-10.0)


 


Eosinophils (%) (Auto)


  14.3 %


(0.0-3.0)  H


 


Basophils (%) (Auto)


  0.6 %


(0.0-2.0)


 


Sodium Level


  136 MMOL/L


(136-145)


 


Potassium Level


  4.3 MMOL/L


(3.5-5.1)


 


Chloride Level


  101 MMOL/L


()


 


Carbon Dioxide Level


  28 MMOL/L


(21-32)


 


Anion Gap


  7 mmol/L


(5-15)


 


Blood Urea Nitrogen


  49 mg/dL


(7-18)  H


 


Creatinine


  5.9 MG/DL


(0.55-1.30)  H


 


Estimat Glomerular Filtration


Rate  mL/min (>60)  


 


 


Glucose Level


  146 MG/DL


()  H


 


Calcium Level


  9.2 MG/DL


(8.5-10.1)








Height (Feet):  5


Height (Inches):  8.00


Weight (Pounds):  150


General Appearance:  WD/WN, no apparent distress, alert


Cardiovascular:  normal rate


Respiratory/Chest:  normal breath sounds, no respiratory distress


Abdominal Exam:  normal bowel sounds, non tender, soft, GT site - Clean dry and 

intact


Extremities:  non-tender











Iesha Lopez NP Jan 28, 2019 11:15

## 2019-01-28 NOTE — NUR
CONCERNING MRI...DESPITE PT RECEIVING IV SEDATION AND BEING STRAPPED DOWN AT THE LEGS, PT 
REPEATEDLY DID NOT KEEP HIS FOOT WITHIN THE MRI COIL. PT ALSO CAN NOT STRAIGHTEN HIS LEGS 
OUT DUE TO BEING CONTRACTED ABOUT 45 DEGREES. MARCIN GOMES HAS BEEN INFORMED. TJB 13:15

## 2019-01-28 NOTE — CONSULTATION
History of Present Illness


General


Chief Complaint:  General Complaint


Referring physician:  MANUEL MARLOW


Reason for Consultation:  B/L Heel ulcerations





Present Illness


HPI


83-year-old  male, who is on dialysis and is severely 

malfunctioned. Im well familiar with this pt from  the pt has dementia with 

behavioral disturbance and he receives Depakote. the pt is unable to 

participate answer the questions appropriately. poor memory


Allergies:  


Coded Allergies:  


     No Known Allergies (Unverified , 3/20/18)





Medication History


Scheduled


Amlodipine Besylate (Norvasc), 5 MG GT Q12HR


Amlodipine Besylate* (Amlodipine Besylate*), 5 MG PO BID, (Reported)


Aspirin* (Aspir 81*), 81 MG ORAL DAILY, (Reported)


Aspirin* (Aspirin*), 81 MG GT DAILY


Cholecalciferol (Vitamin D3)* (Vitamin D*), 1,000 UNIT ORAL DAILY, (Reported)


Cholecalciferol (Vitamin D3)* (Vitamin D*), 1,000 INTLU GT DAILY


Divalproex Sodium (Divalproex Sodium), 250 MG GT Q8HR


Divalproex Sodium* (Depakote Er*), 250 MG ORAL TID, (Reported)


Heparin Sod (Porcine) (Heparin Sodium*), 5,000 UNITS SUBQ EVERY 12 HOURS


Hydralazine Hcl* (Hydralazine Hcl*), 25 MG PO QID, (Reported)


Hydralazine Hcl* (Hydralazine Hcl*), 25 MG GT QID


Labetalol HCl (Labetalol HCl), 400 MG GT EVERY 12 HOURS


Labetalol Hcl* (Normodyne*), 400 MG PO EVERY 12 HOURS, (Reported)


Levetiracetam (Keppra), 500 MG GT Q12HR


Levetiracetam* (Levetiracetam*), 500 MG PO BID, (Reported)


Levothyroxine Sodium* (Levothyroxine Sodium*), 125 MCG PO DAILY, (Reported)


Levothyroxine Sodium* (Levothyroxine Sodium*), 125 MCG GT DAILY@0630


Methoxy Peg-Epoetin Beta (Mircera), 75 MCG IJ Q2WKS ON 3RD HD, (Reported)


Minoxidil (Minoxidil), 5 MG GT DAILY


Minoxidil* (Loniten*), 5 MG PO DAILY, (Reported)


Tamsulosin Hcl (Tamsulosin Hcl*), 0.4 MG PO BEDTIME, (Reported)


Terazosin HCl (Terazosin HCl), 1 MG ORAL DAILY


Vitamin B Cmplx/Vit C/Folic AC (Nephro-Saadia Tablet), 1 TAB PO BID, (Reported)


Vitamin B Cmplx/Vit C/Folic AC (Nephro-Saadia Tablet), 1 TAB GT BID





Patient History


Healthcare decision maker


N


Resuscitation status





Advanced Directive on File








Past Medical/Surgical History


Past Medical/Surgical History:  


(1) Encounter for generalized patient complaints


(2) Constipation


(3) Anemia


(4) Dementia


(5) GERD (gastroesophageal reflux disease)


(6) Severe malnutrition


(7) PEG (percutaneous endoscopic gastrostomy) adjustment/replacement/removal


(8) Dehydration


(9) ESRD (end stage renal disease)


(10) Arthritis


(11) Hypothyroidism


(12) Chronic pancreatitis


(13) Weakness


(14) HTN (hypertension)


(15) Colonoscopy planned





Review of Systems


Psychiatric:  Reports: prior hx, anxiety, depressed feelings, emotional problems

, hallucinations





Physical Exam


General Appearance:  alert, confused, agitated





Last 24 Hour Vital Signs








  Date Time  Temp Pulse Resp B/P (MAP) Pulse Ox O2 Delivery O2 Flow Rate FiO2


 


1/28/19 09:00      Room Air  


 


1/28/19 08:00 97.8 68 18 134/44 (74) 98   


 


1/28/19 08:00  67      


 


1/28/19 04:00 98.6 71 18 143/45 (77) 98   


 


1/28/19 03:53  68      


 


1/28/19 00:00 98.9 67 18 124/40 (68) 98   


 


1/27/19 23:53  68      


 


1/27/19 22:37    139/44    


 


1/27/19 22:37  71  139/44    


 


1/27/19 22:36  71  139/44    


 


1/27/19 21:00      Room Air  


 


1/27/19 20:01  71      


 


1/27/19 20:00 98.6 71 18 139/44 (75) 99   


 


1/27/19 18:40    132/47    


 


1/27/19 15:59 98.0 65 18 132/47 (75) 100   


 


1/27/19 15:21  64      


 


1/27/19 13:00    100/45    

















Intake and Output  


 


 1/27/19 1/28/19





 19:00 07:00


 


Intake Total 40 ml 360 ml


 


Output Total  0 ml


 


Balance 40 ml 360 ml


 


  


 


Free Water  40 ml


 


Tube Feeding 40 ml 320 ml


 


Output Urine Total  0 ml


 


# Voids 1 1











Laboratory Tests








Test


  1/28/19


06:10


 


White Blood Count


  8.3 K/UL


(4.8-10.8)


 


Red Blood Count


  4.70 M/UL


(4.70-6.10)


 


Hemoglobin


  13.0 G/DL


(14.2-18.0)  L


 


Hematocrit


  41.9 %


(42.0-52.0)  L


 


Mean Corpuscular Volume 89 FL (80-99)  


 


Mean Corpuscular Hemoglobin


  27.6 PG


(27.0-31.0)


 


Mean Corpuscular Hemoglobin


Concent 31.0 G/DL


(32.0-36.0)  L


 


Red Cell Distribution Width


  19.4 %


(11.6-14.8)  H


 


Platelet Count


  215 K/UL


(150-450)


 


Mean Platelet Volume


  7.6 FL


(6.5-10.1)


 


Neutrophils (%) (Auto)


  63.9 %


(45.0-75.0)


 


Lymphocytes (%) (Auto)


  11.8 %


(20.0-45.0)  L


 


Monocytes (%) (Auto)


  9.4 %


(1.0-10.0)


 


Eosinophils (%) (Auto)


  14.3 %


(0.0-3.0)  H


 


Basophils (%) (Auto)


  0.6 %


(0.0-2.0)


 


Sodium Level


  136 MMOL/L


(136-145)


 


Potassium Level


  4.3 MMOL/L


(3.5-5.1)


 


Chloride Level


  101 MMOL/L


()


 


Carbon Dioxide Level


  28 MMOL/L


(21-32)


 


Anion Gap


  7 mmol/L


(5-15)


 


Blood Urea Nitrogen


  49 mg/dL


(7-18)  H


 


Creatinine


  5.9 MG/DL


(0.55-1.30)  H


 


Estimat Glomerular Filtration


Rate  mL/min (>60)  


 


 


Glucose Level


  146 MG/DL


()  H


 


Calcium Level


  9.2 MG/DL


(8.5-10.1)








Height (Feet):  5


Height (Inches):  8.00


Weight (Pounds):  150


Medications





Current Medications








 Medications


  (Trade)  Dose


 Ordered  Sig/Simon


 Route


 PRN Reason  Start Time


 Stop Time Status Last Admin


Dose Admin


 


 Amlodipine


 Besylate


  (Norvasc)  5 mg  Q12HR


 GT


   1/25/19 21:00


 2/24/19 20:59  1/27/19 22:37


 


 


 Aspirin


  (ASA)  81 mg  DAILY


 GT


   1/26/19 09:00


 2/25/19 08:59  1/27/19 09:44


 


 


 Divalproex Sodium


  (Depakote


 Sprinkles)  250 mg  Q8HR


 GT


   1/25/19 22:00


 2/24/19 21:59  1/28/19 06:21


 


 


 Heparin Sodium


  (Porcine)


  (Heparin 5000


 units/ml)  5,000 units  EVERY 12  HOURS


 SUBQ


   1/24/19 09:00


 2/23/19 08:59  1/27/19 22:38


 


 


 Heparin Sodium


  (Porcine)


  (Heparin Sod


 1000 units/ml


 10ml)  2,000 unit  ONCE  PRN


 IV


 FOR HD USE ONLY  1/27/19 12:30


 1/28/19 23:59   


 


 


 Hydralazine HCl


  (Apresoline)  25 mg  QID


 GT


   1/25/19 21:00


 2/22/19 22:59  1/27/19 22:37


 


 


 Labetalol HCl


  (Normodyne)  400 mg  EVERY 12  HOURS


 GT


   1/25/19 21:00


 2/22/19 22:59  1/27/19 22:36


 


 


 Levetiracetam


  (Keppra)  500 mg  Q12HR


 GT


   1/25/19 21:00


 2/24/19 20:59  1/28/19 10:16


 


 


 Levothyroxine


 Sodium


  (Synthroid)  125 mcg  DAILY@0630


 GT


   1/26/19 06:30


 2/23/19 06:29  1/28/19 06:25


 


 


 Minoxidil


  (Loniten)  5 mg  DAILY


 GT


   1/26/19 09:00


 2/23/19 08:59  1/27/19 09:44


 


 


 Sodium Chloride  1,000 ml @ 


 500 mls/hr  Q2H PRN


 IVLG


 sbp<90 during hd  1/27/19 12:30


 1/28/19 23:59   


 


 


 Tamsulosin HCl


  (Flomax)  0.4 mg  BEDTIME


 ORAL


   1/23/19 23:00


 2/22/19 22:59  1/27/19 22:36


 


 


 Terazosin HCl


  (Hytrin)  1 mg  DAILY


 GT


   1/29/19 09:00


 2/26/19 08:59   


 


 


 Vitamin B Complex/


 Vit C/Folic Acid


  (Nephrovite)  1 tab  BID


 GT


   1/26/19 09:00


 2/23/19 08:59  1/28/19 10:16


 


 


 Vitamin D


  (Vitamin D)  1,000 intlu  DAILY


 GT


   1/26/19 09:00


 2/23/19 08:59  1/28/19 10:16


 











Assessment/Plan


Problem List:  


(1) Dementia with behavioral disturbance


ICD Codes:  F03.91 - Unspecified dementia with behavioral disturbance


SNOMED:  [id number]


Assessment/Plan


depakote 250mg po tid


zyprexa 5mg qhs prn











Angela Lambert MD Jan 28, 2019 12:33

## 2019-01-28 NOTE — DIAGNOSTIC IMAGING REPORT
--------------- APPROVED REPORT --------------





CPT Code: 54892



Symptoms

Non-healing Ulcer :  Bilaterally 





RIGHT LEG: Common femoral artery waveform analysis is within normal limits at rest. Color 

flow duplex sonography reveals calcification throughout the superficial femoral and 

popliteal arteries. There is no evidence of significant stenosis or occlusion within 

these segments. The tibioperoneal trunk was not well visualized. The distal posterior and 

dorsalis pedis arteries are also mildly calcified. Doppler tibial artery waveform 

analysis is compatible with moderate ischemia at rest.



LEFT LEG: Common femoral artery waveform analysis is within normal limits at rest. Color 

flow duplex sonography reveals calcification throughout the superficial femoral artery. A 

moderate (50-60%) stenosis is seen in the distal superficial femoral tibial artery.  The 

popliteal artery was patent. The tibioperoneal trunk was not well visualized. The distal 

posterior and dorsalis pedis arteries are also mildly calcified. Doppler tibial artery 

waveform analysis is compatible with moderate ischemia at rest.

## 2019-01-28 NOTE — NUR
NURSE NOTES:

Spoke with Rachelle BURCH. Pt is awake, alert, confused, oriented to name only. On room air with 
no respiratory distress. No signs/symptoms of pain present. Left AV shunt present on upper 
arm for dialysis, scheduled to have HD at bedside today. Peripheral IV access on right AC 
#20G, saline lock, patent/intact. Skin has bilateral heel dressings, with bilateral heel 
protectors on, dressings dry/intact, will change/assess during my shift, and as needed. Pt 
is on fall and seizure precautions, bed in lowest position, three side rails up and side 
rails padded, call light is within easy reach, brakes engaged, alarm on. GT feeding, on 
Nepro at 40/hour/goal, pt tolerating well with no residual, no s/s of nausea/vomiting 
present. PT is scheduled for MRI of bilateral feet today. Will continue with plan of care.

## 2019-01-28 NOTE — NUR
NURSE NOTES:

Received report fromDANETTE Nichole RN. Pt is sleeping in the bed w/ distress, easily 
arousable by voice stimuli, receiving GT feeding, Nepro running at 40/H. HOB elevated 45 
degree. Safety measures are applied with bed alarm on, bed in lowest position, and breaks 
are engaged. Seizure precaution are applied with padded side rails and suction is ready. 
Call light and side table are w/in reach. Will follow plans of care.

## 2019-01-28 NOTE — PODIATRIC PROGRESS NOTE
Assessment/Plan


Patient


Mansoor Horton is a 83 year old male who was admitted on Jan 23, 2019 at 17:32 

with


Assessment/Plan


A: 


- B/L Heel ulcerations, stable. 


- S/p GT placement


- DM


- ESRD








P:


- Pt seen and evaluated. 


- chart reviewed. 


- Temp, 96.4


- WBC, 8.3


- ESR, CRP pending. 


- Spoke with MRI Tech today, pt unable to complete study secondary to increased 

motion of B/L LE. Per tech will re-attempt tomorrow. 


- Arterial U/S result showed 


RLE : multi-vessel arterial disease, DP, Post Tib show mild to moderat 

ischemia. 


LLE: multile vessel arterial disease, Stenosis of distal superficial femoral 

tib A. DP, Post Tib show mild to moderat ischemia. 


- Rec vasc consultt based on arterial study for poss vasc intervention. 


- Daily wound care order submitted. 


- B/L LE Pravalon boots. 


- IV ABx. 


- No acute surgical intervention required at this time. Will await official 

imaging studies if possible and any vasc recommendations. 


- Podiatry will cont to monitor.





Subjective


Allergies:  


Coded Allergies:  


     No Known Allergies (Unverified , 3/20/18)


Subjective


Pt seen bedside for B/L heel ulceration. Pt resting at bedside.





Objective


Exam





Last 24 Hour Vital Signs








  Date Time  Temp Pulse Resp B/P (MAP) Pulse Ox O2 Delivery O2 Flow Rate FiO2


 


1/28/19 13:00    120/44    


 


1/28/19 12:00  71      


 


1/28/19 12:00 97.8 72 18 120/44 (69) 100   


 


1/28/19 09:00      Room Air  


 


1/28/19 08:00 97.8 68 18 134/44 (74) 98   


 


1/28/19 08:00  67      


 


1/28/19 04:00 98.6 71 18 143/45 (77) 98   


 


1/28/19 03:53  68      


 


1/28/19 00:00 98.9 67 18 124/40 (68) 98   


 


1/27/19 23:53  68      


 


1/27/19 22:37    139/44    


 


1/27/19 22:37  71  139/44    


 


1/27/19 22:36  71  139/44    


 


1/27/19 21:00      Room Air  


 


1/27/19 20:01  71      


 


1/27/19 20:00 98.6 71 18 139/44 (75) 99   


 


1/27/19 18:40    132/47    











Laboratory Tests








Test


  1/28/19


06:10


 


White Blood Count


  8.3 K/UL


(4.8-10.8)


 


Red Blood Count


  4.70 M/UL


(4.70-6.10)


 


Hemoglobin


  13.0 G/DL


(14.2-18.0)  L


 


Hematocrit


  41.9 %


(42.0-52.0)  L


 


Mean Corpuscular Volume 89 FL (80-99)  


 


Mean Corpuscular Hemoglobin


  27.6 PG


(27.0-31.0)


 


Mean Corpuscular Hemoglobin


Concent 31.0 G/DL


(32.0-36.0)  L


 


Red Cell Distribution Width


  19.4 %


(11.6-14.8)  H


 


Platelet Count


  215 K/UL


(150-450)


 


Mean Platelet Volume


  7.6 FL


(6.5-10.1)


 


Neutrophils (%) (Auto)


  63.9 %


(45.0-75.0)


 


Lymphocytes (%) (Auto)


  11.8 %


(20.0-45.0)  L


 


Monocytes (%) (Auto)


  9.4 %


(1.0-10.0)


 


Eosinophils (%) (Auto)


  14.3 %


(0.0-3.0)  H


 


Basophils (%) (Auto)


  0.6 %


(0.0-2.0)


 


Sodium Level


  136 MMOL/L


(136-145)


 


Potassium Level


  4.3 MMOL/L


(3.5-5.1)


 


Chloride Level


  101 MMOL/L


()


 


Carbon Dioxide Level


  28 MMOL/L


(21-32)


 


Anion Gap


  7 mmol/L


(5-15)


 


Blood Urea Nitrogen


  49 mg/dL


(7-18)  H


 


Creatinine


  5.9 MG/DL


(0.55-1.30)  H


 


Estimat Glomerular Filtration


Rate  mL/min (>60)  


 


 


Glucose Level


  146 MG/DL


()  H


 


Calcium Level


  9.2 MG/DL


(8.5-10.1)











Microbiology








 Date/Time


Source Procedure


Growth Status


 


 


 1/23/19 22:00


Nose MRSA Culture - Final


Staphylococcus Aureus - Mrsa Complete


 


 1/23/19 22:00


Rectum VRE Culture - Final


NO VANCOMYCIN RESISTANT ENTEROCOCCUS ... Complete











Dermatological


Wound Assessment :  


   Exudate Amount:  Scant


Dermatological Narrative


Focused: B/L LE exam:





- Right foot: +1/4 DP/PT pulses. Plantar medial heel ulceration is noted, 

approx 3.0cm x 3.0 cm  with overlying eschar, UTD depth of wound, (-) active 

purulent drainage, normal temp differential, (-) mal-odor


- Left foot: +1/4 DP/PT pulses. Plantar medial heel ulceration is noted, approx 

3.5cm x 3.5cm  with overlying eschar, UTD depth of wound, (-) active purulent 

drainage, normal temp differential, (-) mal-odor











Kobi Dukes DPM Jan 28, 2019 17:57

## 2019-01-29 VITALS — SYSTOLIC BLOOD PRESSURE: 120 MMHG | DIASTOLIC BLOOD PRESSURE: 48 MMHG

## 2019-01-29 VITALS — DIASTOLIC BLOOD PRESSURE: 68 MMHG | SYSTOLIC BLOOD PRESSURE: 142 MMHG

## 2019-01-29 VITALS — SYSTOLIC BLOOD PRESSURE: 149 MMHG | DIASTOLIC BLOOD PRESSURE: 52 MMHG

## 2019-01-29 VITALS — DIASTOLIC BLOOD PRESSURE: 75 MMHG | SYSTOLIC BLOOD PRESSURE: 128 MMHG

## 2019-01-29 LAB
ADD MANUAL DIFF: NO
ANION GAP SERPL CALC-SCNC: 8 MMOL/L (ref 5–15)
BASOPHILS NFR BLD AUTO: 0.7 % (ref 0–2)
BUN SERPL-MCNC: 40 MG/DL (ref 7–18)
CALCIUM SERPL-MCNC: 9.5 MG/DL (ref 8.5–10.1)
CHLORIDE SERPL-SCNC: 100 MMOL/L (ref 98–107)
CO2 SERPL-SCNC: 28 MMOL/L (ref 21–32)
CREAT SERPL-MCNC: 5.1 MG/DL (ref 0.55–1.3)
EOSINOPHIL NFR BLD AUTO: 16.7 % (ref 0–3)
ERYTHROCYTE [DISTWIDTH] IN BLOOD BY AUTOMATED COUNT: 19.5 % (ref 11.6–14.8)
HCT VFR BLD CALC: 44.1 % (ref 42–52)
HGB BLD-MCNC: 13.7 G/DL (ref 14.2–18)
LYMPHOCYTES NFR BLD AUTO: 10 % (ref 20–45)
MCV RBC AUTO: 89 FL (ref 80–99)
MONOCYTES NFR BLD AUTO: 8.2 % (ref 1–10)
NEUTROPHILS NFR BLD AUTO: 64.5 % (ref 45–75)
PLATELET # BLD: 216 K/UL (ref 150–450)
POTASSIUM SERPL-SCNC: 4.2 MMOL/L (ref 3.5–5.1)
RBC # BLD AUTO: 4.95 M/UL (ref 4.7–6.1)
SODIUM SERPL-SCNC: 136 MMOL/L (ref 136–145)
WBC # BLD AUTO: 8 K/UL (ref 4.8–10.8)

## 2019-01-29 RX ADMIN — LEVOTHYROXINE SODIUM SCH MCG: 125 TABLET ORAL at 06:30

## 2019-01-29 RX ADMIN — HEPARIN SODIUM SCH UNITS: 5000 INJECTION INTRAVENOUS; SUBCUTANEOUS at 09:31

## 2019-01-29 RX ADMIN — VITAMIN D, TAB 1000IU (100/BT) SCH INTLU: 25 TAB at 09:28

## 2019-01-29 RX ADMIN — DIVALPROEX SODIUM SCH MG: 125 CAPSULE ORAL at 14:00

## 2019-01-29 RX ADMIN — HYDRALAZINE HYDROCHLORIDE SCH MG: 25 TABLET ORAL at 09:30

## 2019-01-29 RX ADMIN — HYDRALAZINE HYDROCHLORIDE SCH MG: 25 TABLET ORAL at 13:00

## 2019-01-29 RX ADMIN — LEVETIRACETAM SCH MG: 100 SOLUTION ORAL at 09:28

## 2019-01-29 RX ADMIN — DIVALPROEX SODIUM SCH MG: 125 CAPSULE ORAL at 07:13

## 2019-01-29 RX ADMIN — ASPIRIN 81 MG SCH MG: 81 TABLET ORAL at 09:29

## 2019-01-29 RX ADMIN — MINOXIDIL SCH MG: 2.5 TABLET ORAL at 09:29

## 2019-01-29 RX ADMIN — Medication SCH TAB: at 09:29

## 2019-01-29 NOTE — NEPHROLOGY PROGRESS NOTE
Assessment/Plan


Plan


New PEG in. Dietary to recommend.


MRI of ankles, then DC to SNF.


Couldn't do MRI yesterday.





DC to SNF.





Subjective


Subjective


Confused. Post PEG. On HD now .Patient not DC'ed to SNF. Not accepting during 

the weekend.





Objective


Objective





Last 24 Hour Vital Signs








  Date Time  Temp Pulse Resp B/P (MAP) Pulse Ox O2 Delivery O2 Flow Rate FiO2


 


1/29/19 09:30  67  138/75    


 


1/29/19 09:30    138/75    


 


1/29/19 09:29    138/75    


 


1/29/19 09:29  67  138/75    


 


1/29/19 04:05  71      


 


1/29/19 04:00 98.8 71 20 149/52 (84) 98   


 


1/29/19 00:00 98.4 70 20 142/68 (92) 99   


 


1/28/19 23:20  65      


 


1/28/19 22:09  74  159/59    


 


1/28/19 21:40    159/59    


 


1/28/19 21:40  74  159/59    


 


1/28/19 21:00      Room Air  


 


1/28/19 20:00 98.4 74 20 159/59 (92) 99   


 


1/28/19 19:08  65      


 


1/28/19 18:59    147/63    


 


1/28/19 16:00 96.4 71 20 147/63 (91) 99   


 


1/28/19 16:00  68      


 


1/28/19 13:00    120/44    


 


1/28/19 12:00  71      


 


1/28/19 12:00 97.8 72 18 120/44 (69) 100   








Laboratory Tests


1/29/19 07:48: 


White Blood Count 8.0, Red Blood Count 4.95, Hemoglobin 13.7L, Hematocrit 44.1, 

Mean Corpuscular Volume 89, Mean Corpuscular Hemoglobin 27.6, Mean Corpuscular 

Hemoglobin Concent 31.0L, Red Cell Distribution Width 19.5H, Platelet Count 216

, Mean Platelet Volume 6.9, Neutrophils (%) (Auto) 64.5, Lymphocytes (%) (Auto) 

10.0L, Monocytes (%) (Auto) 8.2, Eosinophils (%) (Auto) 16.7H, Basophils (%) (

Auto) 0.7, Sodium Level 136, Potassium Level 4.2, Chloride Level 100, Carbon 

Dioxide Level 28, Anion Gap 8, Blood Urea Nitrogen 40H, Creatinine 5.1H, 

Estimat Glomerular Filtration Rate , Glucose Level 173H, Calcium Level 9.5


Height (Feet):  5


Height (Inches):  8.00


Weight (Pounds):  153


Objective


CV RR


Lungs CTA


Abd SNT. BS + New Patricia Barry MD Jan 29, 2019 11:20

## 2019-01-29 NOTE — NUR
NURSE NOTES:

Pt was discharged per MD order. Tele monitor removed and returned. IV access removed. Pt's 
belonging's list checked/signed. Report given to Lyndsey at receiving facility/SNF. Pt left 
facility in stable condition, via BLS transport. Photo of pt's bilateral heels and sacrum 
taken, will be uploaded.

## 2019-01-29 NOTE — NUR
*-* DISCHARGE PLANNING *-*



PATIENT HAS BEEN REFERRED TO:



[facility]

P:[phone]

F:[phone]

## 2019-01-29 NOTE — NUR
NURSE NOTES:

Pt is sleeping in the bed, arousable by shaking. No distress noted. Will continue to 
monitor.

## 2019-01-29 NOTE — GENERAL PROGRESS NOTE
Assessment/Plan


Problem List:  


(1) Dementia with behavioral disturbance


ICD Codes:  F03.91 - Unspecified dementia with behavioral disturbance


SNOMED:  6452217244968


Assessment/Plan


depakote 250mg po tid


zyprexa 5mg qhs prn





Subjective


Neurologic/Psychiatric:  Reports: anxiety, depressed, emotional problems


Allergies:  


Coded Allergies:  


     No Known Allergies (Unverified , 3/20/18)





Objective





Last 24 Hour Vital Signs








  Date Time  Temp Pulse Resp B/P (MAP) Pulse Ox O2 Delivery O2 Flow Rate FiO2


 


1/29/19 09:30  67  138/75    


 


1/29/19 09:30    138/75    


 


1/29/19 09:29    138/75    


 


1/29/19 09:29  67  138/75    


 


1/29/19 04:05  71      


 


1/29/19 04:00 98.8 71 20 149/52 (84) 98   


 


1/29/19 00:00 98.4 70 20 142/68 (92) 99   


 


1/28/19 23:20  65      


 


1/28/19 22:09  74  159/59    


 


1/28/19 21:40    159/59    


 


1/28/19 21:40  74  159/59    


 


1/28/19 21:00      Room Air  


 


1/28/19 20:00 98.4 74 20 159/59 (92) 99   


 


1/28/19 19:08  65      


 


1/28/19 18:59    147/63    


 


1/28/19 16:00 96.4 71 20 147/63 (91) 99   


 


1/28/19 16:00  68      


 


1/28/19 13:00    120/44    








Laboratory Tests


1/29/19 07:48: 


White Blood Count 8.0, Red Blood Count 4.95, Hemoglobin 13.7L, Hematocrit 44.1, 

Mean Corpuscular Volume 89, Mean Corpuscular Hemoglobin 27.6, Mean Corpuscular 

Hemoglobin Concent 31.0L, Red Cell Distribution Width 19.5H, Platelet Count 216

, Mean Platelet Volume 6.9, Neutrophils (%) (Auto) 64.5, Lymphocytes (%) (Auto) 

10.0L, Monocytes (%) (Auto) 8.2, Eosinophils (%) (Auto) 16.7H, Basophils (%) (

Auto) 0.7, Sodium Level 136, Potassium Level 4.2, Chloride Level 100, Carbon 

Dioxide Level 28, Anion Gap 8, Blood Urea Nitrogen 40H, Creatinine 5.1H, 

Estimat Glomerular Filtration Rate , Glucose Level 173H, Calcium Level 9.5


Height (Feet):  5


Height (Inches):  8.00


Weight (Pounds):  153


General Appearance:  alert, confused, agitated











Angela Lambert MD Jan 29, 2019 12:04

## 2019-01-29 NOTE — NUR
*-* DISCHARGE PLANNED *-*



[facility]

ROOM# [id number]

SKILLED

T:[phone] FOR NURSE TO NURSE REPORT

AMBULANCE HAS BEEN ARRANGED FOR PICK AT [address]

## 2019-01-29 NOTE — NUR
NURSE NOTES:

Received report from Eleonora BURCH. Pt is awake, alert, confused. On room air with no 
respiratory distress. No signs/symptoms of pain or discomfort noted. On GT feeding Nepro at 
40mL/hour, tolerating well, with no episodes of nausea/vomiting, no residual. Dialysis 
access left upper arm, AV shunt, and peripheral line, right AC #20G, saline lock, 
patent/intact. Skin has bilateral feet dressings, dry/intact, will change dressing/reassess 
during my shift as ordered/needed, bilateral heel protectors on. Pt is placed on seizure and 
fall precautions, side rails padded, suction and oxygen set up at bedside. Call light is 
within easy reach, bed in lowest position, three side rails up, brakes engaged, alarm on. 
Will continue to monitor and follow plan of care per MD orders and protocol.

## 2019-01-29 NOTE — NUR
WENT TO REATTEMPT MRI ON PT AT 13:00, THIS TIME W/O SEDATION. I WAS TOLD PATIENT IS BEING 
DISCHARGED AND THE MRI EXAM WAS CANCELLED PER DR. KESSLER, PER MARCIN GOMES.

## 2019-01-29 NOTE — GI PROGRESS NOTE
Assessment/Plan


Problems:  


(1) Severe malnutrition


ICD Codes:  E43 - Unspecified severe protein-calorie malnutrition


SNOMED:  79825098


(2) GERD (gastroesophageal reflux disease)


ICD Codes:  K21.9 - Gastro-esophageal reflux disease without esophagitis


SNOMED:  686425102


(3) Dementia


ICD Codes:  F03.90 - Unspecified dementia without behavioral disturbance


SNOMED:  19274895


(4) Anemia


ICD Codes:  D64.9 - Anemia, unspecified


SNOMED:  402984514


(5) Constipation


ICD Codes:  K59.00 - Constipation, unspecified


SNOMED:  26462280


(6) Encounter for generalized patient complaints


ICD Codes:  Z00.8 - Encounter for other general examination


SNOMED:  846197048


(7) Dehydration


ICD Codes:  E86.0 - Dehydration


SNOMED:  28087738


(8) PEG (percutaneous endoscopic gastrostomy) adjustment/replacement/removal


ICD Codes:  Z43.1 - Encounter for attention to gastrostomy


SNOMED:  937549400, 740475149


Status:  stable


Status Narrative


Discussed with Dr. Gupta


Assessment/Plan


s/p GT placement 


GTF per RD to goal


GT care


bowel regime


dc planning per primary team





The patient was seen and examined at bedside and all new and available data was 

reviewed in the patients chart. I agree with the above findings, impression 

and plan.  (Patient seen earlier today. Signature stamp does not reflect 

patient encounter time.). - Alexy Gupta MD





Subjective


Gastrointestinal/Abdominal:  Reports: no symptoms





Objective





Last 24 Hour Vital Signs








  Date Time  Temp Pulse Resp B/P (MAP) Pulse Ox O2 Delivery O2 Flow Rate FiO2


 


1/29/19 12:00  60      


 


1/29/19 12:00 98.2 60 20 120/48 (72) 99   


 


1/29/19 09:30  67  138/75    


 


1/29/19 09:30    138/75    


 


1/29/19 09:29    138/75    


 


1/29/19 09:29  67  138/75    


 


1/29/19 09:00      Room Air  


 


1/29/19 08:00 98.1 67 20 128/75 (92) 100   


 


1/29/19 08:00  65      


 


1/29/19 04:05  71      


 


1/29/19 04:00 98.8 71 20 149/52 (84) 98   


 


1/29/19 00:00 98.4 70 20 142/68 (92) 99   


 


1/28/19 23:20  65      


 


1/28/19 22:09  74  159/59    


 


1/28/19 21:40    159/59    


 


1/28/19 21:40  74  159/59    


 


1/28/19 21:00      Room Air  


 


1/28/19 20:00 98.4 74 20 159/59 (92) 99   


 


1/28/19 19:08  65      


 


1/28/19 18:59    147/63    


 


1/28/19 16:00 96.4 71 20 147/63 (91) 99   


 


1/28/19 16:00  68      











Laboratory Tests








Test


  1/29/19


07:48


 


White Blood Count


  8.0 K/UL


(4.8-10.8)


 


Red Blood Count


  4.95 M/UL


(4.70-6.10)


 


Hemoglobin


  13.7 G/DL


(14.2-18.0)  L


 


Hematocrit


  44.1 %


(42.0-52.0)


 


Mean Corpuscular Volume 89 FL (80-99)  


 


Mean Corpuscular Hemoglobin


  27.6 PG


(27.0-31.0)


 


Mean Corpuscular Hemoglobin


Concent 31.0 G/DL


(32.0-36.0)  L


 


Red Cell Distribution Width


  19.5 %


(11.6-14.8)  H


 


Platelet Count


  216 K/UL


(150-450)


 


Mean Platelet Volume


  6.9 FL


(6.5-10.1)


 


Neutrophils (%) (Auto)


  64.5 %


(45.0-75.0)


 


Lymphocytes (%) (Auto)


  10.0 %


(20.0-45.0)  L


 


Monocytes (%) (Auto)


  8.2 %


(1.0-10.0)


 


Eosinophils (%) (Auto)


  16.7 %


(0.0-3.0)  H


 


Basophils (%) (Auto)


  0.7 %


(0.0-2.0)


 


Sodium Level


  136 MMOL/L


(136-145)


 


Potassium Level


  4.2 MMOL/L


(3.5-5.1)


 


Chloride Level


  100 MMOL/L


()


 


Carbon Dioxide Level


  28 MMOL/L


(21-32)


 


Anion Gap


  8 mmol/L


(5-15)


 


Blood Urea Nitrogen


  40 mg/dL


(7-18)  H


 


Creatinine


  5.1 MG/DL


(0.55-1.30)  H


 


Estimat Glomerular Filtration


Rate  mL/min (>60)  


 


 


Glucose Level


  173 MG/DL


()  H


 


Calcium Level


  9.5 MG/DL


(8.5-10.1)








Height (Feet):  5


Height (Inches):  8.00


Weight (Pounds):  153


General Appearance:  WD/WN, no apparent distress, alert


Cardiovascular:  normal rate


Respiratory/Chest:  normal breath sounds, no respiratory distress


Abdominal Exam:  normal bowel sounds, non tender, soft, GT site - Clean dry and 

intact


Extremities:  normal range of motion, non-tender











Iesha Lopez NP Jan 29, 2019 13:20

## 2019-01-30 NOTE — DISCHARGE SUMMARY
Discharge Summary


Discharge Summary


_


DATE OF ADMISSION: 1/23/2019





DATE OF DISCHARGE: 1/29/2019








DISCHARGED BY: Dr. Holley





REASON FOR ADMISSION: 


83 years old male with past medical history of end-stage renal failure due to 

diabetic nephropathy, degenerative joint disease, organic brain syndrome, 

hypertensive cardiovascular disease, hypothyroidism, seizure disorder, on 

hemodialysis every Monday, Wednesday and Friday,   noted to be severely  

malnourished.  


Patient   had a G-tube  in the past, that was removed several months ago.  


After removal of the G-tube patient declined nutritional intake, and according 

to nursing staff at the facility   did not consume enough calories and protein.


Laboratory workup revealed no leukocytosis,  stable hemoglobin hematocrit.  


BUN 11, creatinine 3.0.  


Troponin negative.  


Albumin 2.8.  


Stable electrolytes.  


TSH 5.67  


Serum alcohol level was negative. 


Patient was admitted for   placement of G-tube. 





CONSULTANTS:


GI specialist Dr. Gupta


Podiatrist Dr. Dukes


surgery Dr. Bear


psychiatrist 


 


\[facility]\"" COURSE: 


Patient admitted to telemetry floor.  


Hemodialysis provided as per nephrologist recommendations with  close 

monitoring of  volumes , cardiorenal parameters and electrolytes.  


GI specialist  and surgery consults were requested.


Consent was obtained, and patient subsequently undergone on 1/ 25 upper 

endoscopy with PEG placement.  


Abdominal binder was applied postprocedure.  


G-tube site care provided.  


Tube  feeding started later as per registered dietitian recommendation of tube 

feeding type  and goal rate.  


Strict aspiration and reflux  precautions were  maintained.  


Patient was able to tolerate tube feeding.  


Bowel regimen instituted.  


GI prophylaxis with PPI provided.  


Nutritional recommendation regarding protein supplements implemented in plan of 

care.





General surgeon seen and evaluated patient for deep tissue pressure injury to 

right heel and stable dry eschar to left heel.  


Wound care provided  as per  general surgeon recommendation.  


Podiatry consult was recommended , who   seen and evaluated patient for 

bilateral heel ulceration .


X-ray of the  bilateral  foot reveal no evidence of acute fracture , and   

showed soft tissue edema.   


Arterial duplex bilateral lower extremity revealed moderate ischemia  tibial 

artery bilaterally and moderate stenosis with 50-60% in the distal superficial 

femoral tibial vein left lower extremity.  


Per podiatrist evaluation,  no acute surgical intervention was required at this 

time.  


MRI of bilateral foot , as ordered by podiatrist, unable to be done prior to 

discharge , and  recommended  to be done as outpatient due to elevated 

inflammatory markers:  


ESR   42 , CRP . 12.7 . 


Wound culture of the right foot revealed MRSA.  Antibiotic provided  as per  

podiatry recommendation.








Skilled nursing facility medications were resumed.  


Blood pressure was managed with  multiply regimen of antihypertensive , 

remained stable.  


Dose of Synthroid was uptitrated  due to elevated TSH.  


Seizure precautions were maintained, Depakote and  Keppra was continued.  


Antiplatelet therapy with aspirin continued.  


GI prophylaxis provided.  





Psychiatrist closely followed .  


Psychiatrist y diagnosed patient with dementia with behavioral disturbances.  


Psychiatric medication regimen optimized as per psychiatrist.  


Reality orientation supportive therapy provided.  





Patient clinically stabilized.  Patient tolerated G-tube feedings.  


Patient was stable for discharge back to skilled nursing facility for 

continuation of care. 





FINAL DIAGNOSES: 


Severe protein calorie malnutrition.


Dehydration


Status post EGD with placement of gastrostomy tube


GERD


Bilateral heel ulcer


End-stage renal disease due to diabetic nephropathy; on hemodialysis


Diabetes mellitus type 2


Dementia with behavioral disturbances


Degenerative joint disease.


Organic brain syndrome.


Hypertensive cardiovascular disease.


Hypothyroidism.


Seizure disorder.





DISCHARGE MEDICATIONS:


See Medication Reconciliation list.





DISCHARGE INSTRUCTIONS:


Patient was discharged to the skilled nursing facility. 


Follow up with medical doctor at the facility.








I have been assigned to dictate discharge summary for this account. 


I was not involved in the patient's management.











Ariana Lozano NP Jan 30, 2019 10:06

## 2019-03-13 ENCOUNTER — HOSPITAL ENCOUNTER (INPATIENT)
Dept: HOSPITAL 72 - 4E | Age: 84
LOS: 2 days | Discharge: NURSING, REHAB OR LONG-TERM CARE | DRG: 202 | End: 2019-03-15
Payer: MEDICARE

## 2019-03-13 VITALS — WEIGHT: 134.56 LBS | BODY MASS INDEX: 20.39 KG/M2 | HEIGHT: 68 IN

## 2019-03-13 VITALS — SYSTOLIC BLOOD PRESSURE: 147 MMHG | DIASTOLIC BLOOD PRESSURE: 68 MMHG

## 2019-03-13 DIAGNOSIS — Z99.2: ICD-10-CM

## 2019-03-13 DIAGNOSIS — N18.6: ICD-10-CM

## 2019-03-13 DIAGNOSIS — L89.619: ICD-10-CM

## 2019-03-13 DIAGNOSIS — I95.9: ICD-10-CM

## 2019-03-13 DIAGNOSIS — J20.9: Primary | ICD-10-CM

## 2019-03-13 DIAGNOSIS — L89.620: ICD-10-CM

## 2019-03-13 DIAGNOSIS — L89.220: ICD-10-CM

## 2019-03-13 DIAGNOSIS — F03.91: ICD-10-CM

## 2019-03-13 PROCEDURE — 80048 BASIC METABOLIC PNL TOTAL CA: CPT

## 2019-03-13 PROCEDURE — 85007 BL SMEAR W/DIFF WBC COUNT: CPT

## 2019-03-13 PROCEDURE — 87081 CULTURE SCREEN ONLY: CPT

## 2019-03-13 PROCEDURE — 71045 X-RAY EXAM CHEST 1 VIEW: CPT

## 2019-03-13 PROCEDURE — 85025 COMPLETE CBC W/AUTO DIFF WBC: CPT

## 2019-03-13 PROCEDURE — 94664 DEMO&/EVAL PT USE INHALER: CPT

## 2019-03-13 PROCEDURE — 36415 COLL VENOUS BLD VENIPUNCTURE: CPT

## 2019-03-13 RX ADMIN — HEPARIN SODIUM SCH UNITS: 5000 INJECTION INTRAVENOUS; SUBCUTANEOUS at 21:00

## 2019-03-13 RX ADMIN — LABETALOL HCL SCH MG: 200 TABLET, FILM COATED ORAL at 22:16

## 2019-03-13 RX ADMIN — LEVETIRACETAM SCH MG: 100 SOLUTION ORAL at 22:15

## 2019-03-13 NOTE — NUR
NURSE NOTES:

Unable to carry out order for flomax 0.4 per pharmacy medication adheres to gtube. Dr roberts 
. awaiting return call endorsed to oncoming nurse. Will follow in am if necessary

## 2019-03-13 NOTE — NUR
NURSE NOTES:

Received report from MARCIN Hough. Patient A&Ox1 to name. IV intact, patent, and saline locked. 
On room air, no signs of distress or labored breathing. AV shunt has bruit and thrill 
present. Side rails x3. Bed in lowest position with call light in reach. Will continue with 
plan of care.

## 2019-03-13 NOTE — NUR
NURSE NOTES:

Pt received as a direct admit under the direction of Dr. Kim. Per report of MD pt 
experienced low b/p while at dialysis center. Pt is only orientated to name. Breathing room 
air. Gtube pt. Has multiple skin concerns to bilateral heels, and lateral left foot. Orders 
obtained from Dr. Kim. Pt is only orientated to name. Required redirection for attempts 
of removing clothing. [facility] phoned to obtain vaccinations hx. Pt is a poor 
historian, unable to determine past medical hx. All anticipated needs met. No facial grimace 
of pain, noted. facility protocol put in place for skin concerns. Writer spoke to daughter 
informed daughter pt is in stable condition and current plan of care.

## 2019-03-14 VITALS — DIASTOLIC BLOOD PRESSURE: 56 MMHG | SYSTOLIC BLOOD PRESSURE: 157 MMHG

## 2019-03-14 VITALS — DIASTOLIC BLOOD PRESSURE: 50 MMHG | SYSTOLIC BLOOD PRESSURE: 148 MMHG

## 2019-03-14 VITALS — SYSTOLIC BLOOD PRESSURE: 119 MMHG | DIASTOLIC BLOOD PRESSURE: 79 MMHG

## 2019-03-14 VITALS — DIASTOLIC BLOOD PRESSURE: 50 MMHG | SYSTOLIC BLOOD PRESSURE: 143 MMHG

## 2019-03-14 VITALS — DIASTOLIC BLOOD PRESSURE: 58 MMHG | SYSTOLIC BLOOD PRESSURE: 149 MMHG

## 2019-03-14 LAB
ADD MANUAL DIFF: YES
ANION GAP SERPL CALC-SCNC: 6 MMOL/L (ref 5–15)
BUN SERPL-MCNC: 24 MG/DL (ref 7–18)
CALCIUM SERPL-MCNC: 9.4 MG/DL (ref 8.5–10.1)
CHLORIDE SERPL-SCNC: 109 MMOL/L (ref 98–107)
CO2 SERPL-SCNC: 28 MMOL/L (ref 21–32)
CREAT SERPL-MCNC: 2.8 MG/DL (ref 0.55–1.3)
ERYTHROCYTE [DISTWIDTH] IN BLOOD BY AUTOMATED COUNT: 18.3 % (ref 11.6–14.8)
HCT VFR BLD CALC: 36.4 % (ref 42–52)
HGB BLD-MCNC: 11.3 G/DL (ref 14.2–18)
MCV RBC AUTO: 87 FL (ref 80–99)
PLATELET # BLD: 237 K/UL (ref 150–450)
POTASSIUM SERPL-SCNC: 3.6 MMOL/L (ref 3.5–5.1)
RBC # BLD AUTO: 4.2 M/UL (ref 4.7–6.1)
SODIUM SERPL-SCNC: 143 MMOL/L (ref 136–145)
WBC # BLD AUTO: 7 K/UL (ref 4.8–10.8)

## 2019-03-14 RX ADMIN — DIVALPROEX SODIUM SCH MG: 125 CAPSULE ORAL at 13:25

## 2019-03-14 RX ADMIN — HEPARIN SODIUM SCH UNITS: 5000 INJECTION INTRAVENOUS; SUBCUTANEOUS at 09:00

## 2019-03-14 RX ADMIN — LABETALOL HCL SCH MG: 200 TABLET, FILM COATED ORAL at 20:27

## 2019-03-14 RX ADMIN — LEVOTHYROXINE SODIUM SCH MCG: 125 TABLET ORAL at 06:49

## 2019-03-14 RX ADMIN — LEVETIRACETAM SCH MG: 100 SOLUTION ORAL at 20:28

## 2019-03-14 RX ADMIN — VITAMIN D, TAB 1000IU (100/BT) SCH INTLU: 25 TAB at 08:57

## 2019-03-14 RX ADMIN — DIVALPROEX SODIUM SCH MG: 125 CAPSULE ORAL at 08:57

## 2019-03-14 RX ADMIN — ASPIRIN 81 MG SCH MG: 81 TABLET ORAL at 08:58

## 2019-03-14 RX ADMIN — HEPARIN SODIUM SCH UNITS: 5000 INJECTION INTRAVENOUS; SUBCUTANEOUS at 20:30

## 2019-03-14 RX ADMIN — DIVALPROEX SODIUM SCH MG: 125 CAPSULE ORAL at 17:16

## 2019-03-14 RX ADMIN — LEVETIRACETAM SCH MG: 100 SOLUTION ORAL at 09:01

## 2019-03-14 RX ADMIN — Medication SCH TAB: at 08:57

## 2019-03-14 NOTE — NUR
NURSE NOTES:

Pt awake talking to self. All anticipated need will require to be met. Bed is in safe 
position

## 2019-03-14 NOTE — NUR
82 Y/O MALE BIBA AS DIRECT ADMIT FROM DIALYSIS CENTER 





CC:LOW BLOOD PRESSURE WHILE AT DIALYSIS CENTER



SI:LOW BLOOD PRESSURE . CKD

VS: /50, P 20, T 97.1, RR 20, SpO2 100

BUN 24, CR 2.8, Glucose 145, Chloride 109, RBC 4.20



IS:DIVALPROEX SODIUM 250mg GT

LEVOFLOXACIN 50ml IVPB

NORMODYNE 200mg GT





*************ADMITTED TO MED/SURG*************



*******DC PLAN: RETURN TO [facility].********

## 2019-03-14 NOTE — DIAGNOSTIC IMAGING REPORT
Indication: Cough

 

Technique: One view of the chest

 

Comparison: none

 

Findings: , And pleural spaces are clear. The heart size is normal. The aorta is

calcified. Venous stents are seen in the left upper arm

 

Impression: No acute process

## 2019-03-14 NOTE — NUR
NURSE NOTES:

Received report from MARCIN Hough. Patient A&Ox1, confused. On room air, no signs of distress 
or labored breathing. IV D/C'd per patient, will replace. GT intact, patent, and infusing 
feeding. HOB at least 30 degrees. Bed in lowest position with call light in reach. Will 
continue with plan of care.

## 2019-03-14 NOTE — NUR
NURSE NOTES:

 here in facility made aware of flomax order per pharmacy that it adheres to tubing. Dr 
gave directions not to proceed with orders for flomax. He also reviewed wound care 
recommendations gave approval for wound care recommendations

## 2019-03-14 NOTE — CONSULTATION
History of Present Illness


Present Illness


HPI


83-year-old male, with hx of dementia with behavioral disturbance and agitation 

who became short of breath and hypotensive. The pt is confused. His meds were 

recently readjusted. the pt is less agitated on depakote. the pt is has memory 

problems and is unable to provided any history.


Allergies:  


Coded Allergies:  


     No Known Allergies (Unverified , 3/20/18)





Medication History


Scheduled


Amlodipine Besylate (Norvasc), 5 MG GT Q12HR


Amlodipine Besylate* (Amlodipine Besylate*), 5 MG PO BID, (Reported)


Aspirin* (Aspir 81*), 81 MG ORAL DAILY, (Reported)


Aspirin* (Aspirin*), 81 MG GT DAILY


Cholecalciferol (Vitamin D3)* (Vitamin D*), 1,000 UNIT ORAL DAILY, (Reported)


Cholecalciferol (Vitamin D3)* (Vitamin D*), 1,000 INTLU GT DAILY


Divalproex Sodium (Divalproex Sodium), 250 MG GT Q8HR


Divalproex Sodium* (Depakote Er*), 250 MG ORAL TID, (Reported)


Heparin Sod (Porcine) (Heparin Sodium*), 5,000 UNITS SUBQ EVERY 12 HOURS


Hydralazine Hcl* (Hydralazine Hcl*), 25 MG PO QID, (Reported)


Hydralazine Hcl* (Hydralazine Hcl*), 25 MG GT QID


Labetalol HCl (Labetalol HCl), 400 MG GT EVERY 12 HOURS


Labetalol Hcl* (Normodyne*), 400 MG PO EVERY 12 HOURS, (Reported)


Levetiracetam (Keppra), 500 MG GT Q12HR


Levetiracetam* (Levetiracetam*), 500 MG PO BID, (Reported)


Levothyroxine Sodium* (Levothyroxine Sodium*), 125 MCG PO DAILY, (Reported)


Levothyroxine Sodium* (Levothyroxine Sodium*), 125 MCG GT DAILY@0630


Methoxy Peg-Epoetin Beta (Mircera), 75 MCG IJ Q2WKS ON 3RD HD, (Reported)


Minoxidil (Minoxidil), 5 MG GT DAILY


Minoxidil* (Loniten*), 5 MG PO DAILY, (Reported)


Tamsulosin Hcl (Tamsulosin Hcl*), 0.4 MG PO BEDTIME, (Reported)


Terazosin HCl (Terazosin HCl), 1 MG ORAL DAILY


Vitamin B Cmplx/Vit C/Folic AC (Nephro-Saadia Tablet), 1 TAB PO BID, (Reported)


Vitamin B Cmplx/Vit C/Folic AC (Nephro-Saadia Tablet), 1 TAB GT BID





Patient History


Limited by:  medical condition


History Provided By:  Medical Record, PMD


Healthcare decision maker





Resuscitation status


Full Code


Advanced Directive on File


No





Past Medical/Surgical History


Past Medical/Surgical History:  


(1) Pneumonia


(2) Encounter for generalized patient complaints


(3) Constipation


(4) Anemia


(5) Dementia


(6) GERD (gastroesophageal reflux disease)


(7) Severe malnutrition


(8) PEG (percutaneous endoscopic gastrostomy) adjustment/replacement/removal


(9) Dehydration


(10) ESRD (end stage renal disease)


(11) Arthritis


(12) Hypothyroidism


(13) Chronic pancreatitis


(14) Weakness


(15) HTN (hypertension)


(16) Colonoscopy planned


(17) Dementia with behavioral disturbance





Review of Systems


Psychiatric:  Reports: prior hx, anxiety, depressed feelings, emotional problems





Physical Exam


General Appearance:  WD/WN, alert, confused, agitated





Last 24 Hour Vital Signs








  Date Time  Temp Pulse Resp B/P (MAP) Pulse Ox O2 Delivery O2 Flow Rate FiO2


 


3/14/19 09:55  68 20     


 


3/14/19 09:00      Room Air  


 


3/14/19 08:59  69  151/56    


 


3/14/19 08:00 98.2 67  157/56 (89)    


 


3/14/19 04:00 97.1 65 20 148/50 (82) 100   


 


3/14/19 00:00 97.4 63 20 149/58 (88) 100   


 


3/13/19 22:16  68  147/68    


 


3/13/19 21:00      Room Air  


 


3/13/19 20:00 97.4 68 20 147/68 (94) 100   


 


3/13/19 18:02      Room Air  

















Intake and Output  


 


 3/13/19 3/14/19





 19:00 07:00


 


Intake Total  545 ml


 


Balance  545 ml


 


  


 


Intake Free Water  180 ml


 


IV Total  50 ml


 


Tube Feeding  315 ml











Laboratory Tests








Test


  3/14/19


06:10


 


White Blood Count


  7.0 K/UL


(4.8-10.8)


 


Red Blood Count


  4.20 M/UL


(4.70-6.10)  L


 


Hemoglobin


  11.3 G/DL


(14.2-18.0)  L


 


Hematocrit


  36.4 %


(42.0-52.0)  L


 


Mean Corpuscular Volume 87 FL (80-99)  


 


Mean Corpuscular Hemoglobin


  26.8 PG


(27.0-31.0)  L


 


Mean Corpuscular Hemoglobin


Concent 30.9 G/DL


(32.0-36.0)  L


 


Red Cell Distribution Width


  18.3 %


(11.6-14.8)  H


 


Platelet Count


  237 K/UL


(150-450)


 


Mean Platelet Volume


  6.4 FL


(6.5-10.1)  L


 


Neutrophils (%) (Auto)


  % (45.0-75.0)


 


 


Lymphocytes (%) (Auto)


  % (20.0-45.0)


 


 


Monocytes (%) (Auto)  % (1.0-10.0)  


 


Eosinophils (%) (Auto)  % (0.0-3.0)  


 


Basophils (%) (Auto)  % (0.0-2.0)  


 


Differential Total Cells


Counted 100  


 


 


Neutrophils % (Manual) 53 % (45-75)  


 


Lymphocytes % (Manual) 11 % (20-45)  L


 


Monocytes % (Manual) 7 % (1-10)  


 


Eosinophils % (Manual) 29 % (0-3)  H


 


Basophils % (Manual) 0 % (0-2)  


 


Band Neutrophils 0 % (0-8)  


 


Platelet Estimate Adequate  


 


Platelet Morphology Normal  


 


Sodium Level


  143 MMOL/L


(136-145)


 


Potassium Level


  3.6 MMOL/L


(3.5-5.1)


 


Chloride Level


  109 MMOL/L


()  H


 


Carbon Dioxide Level


  28 MMOL/L


(21-32)


 


Anion Gap


  6 mmol/L


(5-15)


 


Blood Urea Nitrogen


  24 mg/dL


(7-18)  H


 


Creatinine


  2.8 MG/DL


(0.55-1.30)  H


 


Estimat Glomerular Filtration


Rate  mL/min (>60)  


 


 


Glucose Level


  145 MG/DL


()  H


 


Calcium Level


  9.4 MG/DL


(8.5-10.1)








Height (Feet):  5


Height (Inches):  8.00


Weight (Pounds):  134


Medications





Current Medications








 Medications


  (Trade)  Dose


 Ordered  Sig/Simon


 Route


 PRN Reason  Start Time


 Stop Time Status Last Admin


Dose Admin


 


 Albuterol/


 Ipratropium


  (Albuterol/


 Ipratropium)  3 ml  Q6H  PRN


 HHN


 Shortness of Breath  3/13/19 19:30


 3/18/19 19:29   


 


 


 Amlodipine


 Besylate


  (Norvasc)  5 mg  DAILY


 GT


   3/14/19 09:00


 4/13/19 08:59  3/14/19 08:59


 


 


 Aspirin


  (ASA)  81 mg  DAILY


 GT


   3/14/19 09:00


 4/13/19 08:59  3/14/19 08:58


 


 


 Divalproex Sodium


  (Depakote


 Sprinkles)  250 mg  TID


 GT


   3/14/19 09:00


 4/13/19 08:59  3/14/19 13:25


 


 


 Heparin Sodium


  (Porcine)


  (Heparin 5000


 units/ml)  5,000 units  EVERY 12  HOURS


 SUBQ


   3/13/19 21:00


 4/12/19 20:59   


 


 


 Labetalol HCl


  (Normodyne)  200 mg  Q24HRS


 GT


   3/13/19 21:00


 4/12/19 20:59  3/13/19 22:16


 


 


 Levetiracetam


  (Keppra)  500 mg  Q12HR


 GT


   3/13/19 21:00


 4/12/19 20:59  3/14/19 09:01


 


 


 Levofloxacin  50 ml @ 50


 mls/hr  Q48H


 IVPB


   3/13/19 21:00


 3/20/19 20:59  3/13/19 23:19


 


 


 Levothyroxine


 Sodium


  (Synthroid)  125 mcg  DAILY@0630


 GT


   3/14/19 06:30


 4/13/19 06:29  3/14/19 06:49


 


 


 Vitamin B Complex/


 Vit C/Folic Acid


  (Nephrovite)  1 tab  DAILY


 GT


   3/14/19 09:00


 4/13/19 08:59  3/14/19 08:57


 


 


 Vitamin D


  (Vitamin D)  1,000 intlu  DAILY


 GT


   3/14/19 09:00


 4/13/19 08:59  3/14/19 08:57


 











Assessment/Plan


Problem List:  


(1) Dementia with behavioral disturbance


ICD Codes:  F03.91 - Unspecified dementia with behavioral disturbance


SNOMED:  1460159710209


Status:  unchanged


Assessment/Plan


cont depakote


the pt lacks capacity to make decisions











Angela Lambert MD Mar 14, 2019 14:12

## 2019-03-14 NOTE — NUR
NURSE NOTES:

Dr Kim phoned to follow up on Flomax order. Per pharmacy recommendation flomax adheres 
to gtube tubing, and it is not recommended for gtube administration, Maria Antonia wound care nurse 
also seen pt made recommendations for wound care. Dr Bear also seen pt. Will follow up 
when Dr Ricketts returns call

## 2019-03-14 NOTE — NUR
RD ASSESSMENT & RECOMMENDATIONS

SEE CARE ACTIVITY FOR COMPLETE ASSESSMENT



DAILY ESTIMATED NEEDS:

Needs based on ESRD ON HD, wound, underweight 62kg  

28-33  kcals/kg 

5220-5138  total kcals

1.25-1.8  g protein/kg

  g total protein 

Fluid per MD, on HD   





NUTRITION DIAGNOSIS:

- Increased kcal and protein needs r/t wound healing and renal dysfunction

as evidenced by pt w/ ESRD on HD, multiple wounds and advanced appearing L

heel wound.

- Swallowing difficulty R/T dysphagia, h/o CVA and dementia as

evidenced by s/p PEG placement to meet nutritional needs w/ GT feeding.





CURRENT TF: NEPRO @ 35ML/HR X 22 HRS 





ENTERAL NUTRITION RECOMMENDATIONS:

Nepro @ 45ml/hr x 22 hrs + Prosource x1 pack daily   

to provide 990ml, 1782kcal, 80g +11g prot, 720ml free water 



* Rec to increase goal rate to 45ml/hr- meets 100% est kcal/prot needs

* HOLD 1 hour before and after synthroid meds

* Add Prosource 1 pack daily to better meet est protein needs

* HOB over 30 degrees/ water flush per MD







ADDITIONAL RECOMMENDATIONS:

1) Monitor lytes closely w/ TF, replete as needed 

2) Wound care: add ALYSE BID + Nephrovite daily (f/up w/ WC eval) 

        REC VIT C AS PER NEPHRO MD  

3) F/up w/ H&P  

4) Recalibrate bed scale-> pt is 62kg as per HD center (3/13) 

. 

.

## 2019-03-14 NOTE — NUR
NURSE NOTES:WOUND CARE NOTES:Pt presented on admission with partially opened DTPI L hip. 
Base of wound is maroon -indurated with full thickness opening centrally with 50% 
slough.Dark skin tone without erythema or induration periwound.No odor or exudate 
noted.(L)5cm x (W)3.4cm.

Partially opened DTPI L heel. Medially heel is purple in colour with fluctuance with open 
wound laterally with small amt Biofilm. Wound in it's entirety medially to to laterally  
measures (L)3cm x (W)6cm with open wound measuring (L)1cm x (W))2.3cm. No odor noted.

Scattered partial thickness wounds with pink granulation noted to lateral and dorsal and 
lateral aspect of L foot.

Full thickness pressure injury R heel with 50% yellow slough;50% pink granulation .(+) 
maceration along borders.Small amt non-odorous serous exudate .Periwound without fluctuance 
or induration.(L)5cm x (W)3.4cm. 

Resolving pressure injury to Sacrum .Base of wound with pink epithelial tissue .No odor or 
exudate noted. Dark skin tone without induration or fluctuance periwound. 

Pt noted to have dry scaly plaques to trunk ,and both upper and lower extremities .Pt 
confirmed Hx of Psoriasis. Staff instructed to moisturize skin with Phytoplex Nourishing 
Cream Daily.



Tx.Plan. Cleanse L heel wound with Saline. Apply Therahoney.Apply Cavilon Skin Barrier 
periwound. Cover with ABD pad and

            wrap with Kerlix daily and prn.

            Cleanse R heel with Saline.Apply Therahoney. Apply Cavilon SKin Barrier 
Periwound. Cover with ABD pad .Wrap with

            kerlix Daily and prn.

            Apply Cavilon SKin Barrier to wounds lateral aspects of R and L foot and dorsal 
L foot Daily.

            Cleanse L Hip Wound with Saline. Apply Therahoney .Apply Cavilon Skin Barrier 
wipe periwound. Cover with 

            Optifoam drsg Daily and prn.

            Apply Triad Paste to Sacrum .Cover with Optifoam drsg. Cover with Optifoam drsg 
.Change Q 3 days and prn.

            APM/MARK mattress overlay.

            Reposition at least every 2hours or as tolerated.Minimal positioning on L side.

            Off-load heels with Pillow.

## 2019-03-14 NOTE — HISTORY AND PHYSICAL REPORT
DATE OF ADMISSION:  03/13/2019

CHIEF COMPLAINT:  Hypotension and shortness of breath.



HISTORY OF PRESENT ILLNESS:  This is an 83-year-old male, who on dialysis

yesterday became short of breath and hypotensive.  The patient was

transferred to this hospital with suspicion of pneumonia versus acute

bronchitis.  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

syndrome.



PAST MEDICAL HISTORY:

1. End-stage renal failure, on dialysis.

2. Gastroesophageal reflux disease.

3. Status post PEG.

4. Hypothyroidism.

5. Hypertensive cardiovascular disease.

6. Chronic obstructive pulmonary disease.



HOME MEDICATIONS:  Amlodipine, baby aspirin, vitamin D3, Depakote,

subcutaneous heparin, hydralazine, labetalol, Keppra, Synthroid, Mircera,

and tamsulosin.



ALLERGIES:  No known drug allergies.



FAMILY HISTORY:  Unable to obtain due to mental status.



SOCIAL HISTORY:  Unable to obtain due to mental status.



REVIEW OF SYSTEMS:  Unable to obtain due to mental status.



PHYSICAL EXAMINATION:

GENERAL:  This is an elderly  male, who is in no acute

distress.

VITAL SIGNS:  Blood pressure 119/79, pulse 84 and regular, respirations 20,

and temperature 97.6.

HEENT:  The head is normocephalic and atraumatic.  Pupils are equal, round,

and reactive to light and accommodation consensually.

NECK:  Supple.  Trachea midline.  There was no lymphadenopathy or

thyromegaly.

LUNGS:  Bilateral wheezes and rhonchi.

HEART:  Regular rate and rhythm without rubs, murmurs, or gallops.

ABDOMEN:  Soft.  Bowel sounds were active.  He has a G-tube.

EXTREMITIES:  No clubbing, cyanosis, or edema.  He has bilateral heel

ulcers.

NEUROLOGIC:  He is confused.  There were no gross focal findings.



LABORATORY AND ANCILLARY DATA:  CBC, hemoglobin 11.3 and WBC 7000.  Serum

chemistry, BUN 24 post dialysis and creatinine 2.8 post dialysis.  Chest

x-ray, no acute process.



ASSESSMENT:  Early pneumonia versus acute bronchitis.



PLAN:

1. IV antibiotics.

2. Bronchodilators.

3. Continue nursing home medications.









  ______________________________________________

  Patricia Holley M.D.





DR:  DEA

D:  03/14/2019 18:24

T:  03/14/2019 21:13

JOB#:  [id number]

CC:



ANTONIA

## 2019-03-14 NOTE — NUR
NURSE NOTES:

Confirmed with Scarlett from [facility] dialysis that patient will be receiving dialysis tomorrow, 
3/15/19.

## 2019-03-15 VITALS — DIASTOLIC BLOOD PRESSURE: 56 MMHG | SYSTOLIC BLOOD PRESSURE: 99 MMHG

## 2019-03-15 VITALS — SYSTOLIC BLOOD PRESSURE: 132 MMHG | DIASTOLIC BLOOD PRESSURE: 46 MMHG

## 2019-03-15 VITALS — DIASTOLIC BLOOD PRESSURE: 66 MMHG | SYSTOLIC BLOOD PRESSURE: 132 MMHG

## 2019-03-15 VITALS — SYSTOLIC BLOOD PRESSURE: 143 MMHG | DIASTOLIC BLOOD PRESSURE: 66 MMHG

## 2019-03-15 VITALS — DIASTOLIC BLOOD PRESSURE: 58 MMHG | SYSTOLIC BLOOD PRESSURE: 154 MMHG

## 2019-03-15 VITALS — DIASTOLIC BLOOD PRESSURE: 40 MMHG | SYSTOLIC BLOOD PRESSURE: 138 MMHG

## 2019-03-15 VITALS — DIASTOLIC BLOOD PRESSURE: 60 MMHG | SYSTOLIC BLOOD PRESSURE: 143 MMHG

## 2019-03-15 LAB
ADD MANUAL DIFF: YES
ANION GAP SERPL CALC-SCNC: 8 MMOL/L (ref 5–15)
BUN SERPL-MCNC: 36 MG/DL (ref 7–18)
CALCIUM SERPL-MCNC: 10.1 MG/DL (ref 8.5–10.1)
CHLORIDE SERPL-SCNC: 109 MMOL/L (ref 98–107)
CO2 SERPL-SCNC: 28 MMOL/L (ref 21–32)
CREAT SERPL-MCNC: 4 MG/DL (ref 0.55–1.3)
ERYTHROCYTE [DISTWIDTH] IN BLOOD BY AUTOMATED COUNT: 18.4 % (ref 11.6–14.8)
HCT VFR BLD CALC: 37.6 % (ref 42–52)
HGB BLD-MCNC: 11.5 G/DL (ref 14.2–18)
MCV RBC AUTO: 87 FL (ref 80–99)
PLATELET # BLD: 263 K/UL (ref 150–450)
POTASSIUM SERPL-SCNC: 3.6 MMOL/L (ref 3.5–5.1)
RBC # BLD AUTO: 4.34 M/UL (ref 4.7–6.1)
SODIUM SERPL-SCNC: 145 MMOL/L (ref 136–145)
WBC # BLD AUTO: 8.7 K/UL (ref 4.8–10.8)

## 2019-03-15 PROCEDURE — 5A1D70Z PERFORMANCE OF URINARY FILTRATION, INTERMITTENT, LESS THAN 6 HOURS PER DAY: ICD-10-PCS

## 2019-03-15 RX ADMIN — DIVALPROEX SODIUM SCH MG: 125 CAPSULE ORAL at 17:28

## 2019-03-15 RX ADMIN — DIVALPROEX SODIUM SCH MG: 125 CAPSULE ORAL at 12:26

## 2019-03-15 RX ADMIN — ASPIRIN 81 MG SCH MG: 81 TABLET ORAL at 09:21

## 2019-03-15 RX ADMIN — DIVALPROEX SODIUM SCH MG: 125 CAPSULE ORAL at 09:21

## 2019-03-15 RX ADMIN — VITAMIN D, TAB 1000IU (100/BT) SCH INTLU: 25 TAB at 09:21

## 2019-03-15 RX ADMIN — LEVOTHYROXINE SODIUM SCH MCG: 125 TABLET ORAL at 06:25

## 2019-03-15 RX ADMIN — LEVETIRACETAM SCH MG: 100 SOLUTION ORAL at 09:22

## 2019-03-15 RX ADMIN — LEVETIRACETAM SCH MG: 100 SOLUTION ORAL at 21:14

## 2019-03-15 RX ADMIN — LABETALOL HCL SCH MG: 200 TABLET, FILM COATED ORAL at 21:23

## 2019-03-15 RX ADMIN — HEPARIN SODIUM SCH UNITS: 5000 INJECTION INTRAVENOUS; SUBCUTANEOUS at 21:32

## 2019-03-15 RX ADMIN — HEPARIN SODIUM SCH UNITS: 5000 INJECTION INTRAVENOUS; SUBCUTANEOUS at 09:24

## 2019-03-15 RX ADMIN — Medication SCH TAB: at 09:21

## 2019-03-15 NOTE — NUR
NURSE NOTES: REPORT GIVEN TO MARYLIN, SUPERVISOR, [facility], PATIENT WILL 
RETURN TO PREVIOUS ROOM 124 BED B.

## 2019-03-15 NOTE — NUR
NURSE NOTES:

called [facility] twice today to reconfirm re: HD time today. spoke with heather @ first two hours 
ago then Jeanne right now. awaits for a call back.

## 2019-03-15 NOTE — NUR
NURSE NOTES: HEMODIALYSIS COMPLETE, 2L REMOVED, DRESSING DRY AND INTACT TO LEFT UPPER ARM AV 
SHUNT, NO SIGNS OF BLEEDING. VITALS STABLE, AFEBRILE. NAD.

## 2019-03-15 NOTE — CONSULTATION
History of Present Illness


General


Date patient seen:  Mar 15, 2019





Present Illness


HPI


83 year old male with multiple medical comorbidities who is well known to me 

from prior visits currently admitted for medical care and management was noted 

to still have multiple wounds requiring care. surgery called to evaluate and 

assist with care. patient seen, chart reviewed, patient examined.  no n/v/f/c.  

comfortable.  see photos.


Allergies:  


Coded Allergies:  


     No Known Allergies (Unverified , 3/20/18)





Medication History


Scheduled


Amlodipine Besylate (Norvasc), 5 MG GT Q12HR


Amlodipine Besylate* (Amlodipine Besylate*), 5 MG PO BID, (Reported)


Aspirin* (Aspir 81*), 81 MG ORAL DAILY, (Reported)


Aspirin* (Aspirin*), 81 MG GT DAILY


Cholecalciferol (Vitamin D3)* (Vitamin D*), 1,000 UNIT ORAL DAILY, (Reported)


Cholecalciferol (Vitamin D3)* (Vitamin D*), 1,000 INTLU GT DAILY


Divalproex Sodium (Divalproex Sodium), 250 MG GT Q8HR


Divalproex Sodium* (Depakote Er*), 250 MG ORAL TID, (Reported)


Heparin Sod (Porcine) (Heparin Sodium*), 5,000 UNITS SUBQ EVERY 12 HOURS


Hydralazine Hcl* (Hydralazine Hcl*), 25 MG PO QID, (Reported)


Hydralazine Hcl* (Hydralazine Hcl*), 25 MG GT QID


Labetalol HCl (Labetalol HCl), 400 MG GT EVERY 12 HOURS


Labetalol Hcl* (Normodyne*), 400 MG PO EVERY 12 HOURS, (Reported)


Levetiracetam (Keppra), 500 MG GT Q12HR


Levetiracetam* (Levetiracetam*), 500 MG PO BID, (Reported)


Levothyroxine Sodium* (Levothyroxine Sodium*), 125 MCG PO DAILY, (Reported)


Levothyroxine Sodium* (Levothyroxine Sodium*), 125 MCG GT DAILY@0630


Methoxy Peg-Epoetin Beta (Mircera), 75 MCG IJ Q2WKS ON 3RD HD, (Reported)


Minoxidil (Minoxidil), 5 MG GT DAILY


Minoxidil* (Loniten*), 5 MG PO DAILY, (Reported)


Tamsulosin Hcl (Tamsulosin Hcl*), 0.4 MG PO BEDTIME, (Reported)


Terazosin HCl (Terazosin HCl), 1 MG ORAL DAILY


Vitamin B Cmplx/Vit C/Folic AC (Nephro-Saadia Tablet), 1 TAB PO BID, (Reported)


Vitamin B Cmplx/Vit C/Folic AC (Nephro-Saadia Tablet), 1 TAB GT BID





Patient History


Healthcare decision maker





Resuscitation status


Full Code


Advanced Directive on File


No





Review of Systems


Review of Symptoms


General ROS: no weight loss or fever


Psychological ROS: no depression or mood changes, no memory loss


Ophthalmic ROS: no visual changes or eye irritation


ENT ROS: no nasal congestion, hearing loss, dizziness


Allergy and Immunology ROS: no allergic symptoms or urticaria


Hematological and Lymphatic ROS: no swollen glands, unusual bleeding or bruising


Endocrine ROS: no polyuria, polydipsia, weight changes, temperature intolerance


Respiratory ROS: no cough, shortness of breath, or wheezing


Cardiovascular ROS: no chest pain or dyspnea on exertion


Gastrointestinal ROS: denies abdominal pain, bright red blood in stool.


Musculoskeletal ROS: no myalgias or arthralgias


Neurological ROS: no TIA or stroke symptoms


Dermatological ROS: no new or changing skin lesions, rashes or pruritis





Physical Exam


Physical Exam


General appearance:  alert, cooperative, no distress, appears stated age


Head:  Normocephalic, without obvious abnormality, atraumatic


Eyes:  conjunctivae/corneas clear. PERRL, EOM's intact. Fundi benign


Throat:  Lips, mucosa, and tongue normal. Teeth and gums normal


Neck:  supple, symmetrical, trachea midline, no adenopathy, thyroid: not 

enlarged, symmetric, no tenderness/mass/nodules, no carotid bruit and no JVD


Lungs:  clear to auscultation bilaterally


Heart:  regular rate and rhythm, S1, S2 normal, no murmur, click, rub or gallop


Abdomen:  soft, non-tender. Bowel sounds normal. No masses,  no organomegaly


Extremities:  see photos please 


Pulses:  2+ and symmetric


Skin:  Skin color, texture, turgor normal. No rashes or lesions. see photos 


Neurologic:  Grossly normal





Last 24 Hour Vital Signs








  Date Time  Temp Pulse Resp B/P (MAP) Pulse Ox O2 Delivery O2 Flow Rate FiO2


 


3/15/19 12:00 98.0 65 18 132/46 (74) 98   


 


3/15/19 09:22  60  138/40    


 


3/15/19 09:00      Room Air  


 


3/15/19 08:46  68 20   Room Air  21


 


3/15/19 08:00 98.4 60 18 138/40 (72) 98   


 


3/15/19 04:00 97.7 68 18 154/58 (90) 100   


 


3/15/19 00:00 97.9 64 18 143/66 (91) 100   


 


3/14/19 23:40  62 20   Room Air  21


 


3/14/19 21:00      Room Air  


 


3/14/19 20:27  78  143/50    


 


3/14/19 20:00 97.9 71 18 143/50 (81) 97   


 


3/14/19 15:35 97.6 84 20 119/79 (92) 96   

















Intake and Output  


 


 3/14/19 3/15/19





 19:00 07:00


 


Intake Total 600 ml 340 ml


 


Output Total 400 ml 


 


Balance 200 ml 340 ml


 


  


 


Intake Free Water 180 ml 130 ml


 


Tube Feeding 420 ml 210 ml


 


Output Urine Total 400 ml 


 


# Voids  3


 


# Bowel Movements  1











Laboratory Tests








Test


  3/15/19


06:00


 


White Blood Count


  8.7 K/UL


(4.8-10.8)


 


Red Blood Count


  4.34 M/UL


(4.70-6.10)  L


 


Hemoglobin


  11.5 G/DL


(14.2-18.0)  L


 


Hematocrit


  37.6 %


(42.0-52.0)  L


 


Mean Corpuscular Volume 87 FL (80-99)  


 


Mean Corpuscular Hemoglobin


  26.6 PG


(27.0-31.0)  L


 


Mean Corpuscular Hemoglobin


Concent 30.7 G/DL


(32.0-36.0)  L


 


Red Cell Distribution Width


  18.4 %


(11.6-14.8)  H


 


Platelet Count


  263 K/UL


(150-450)


 


Mean Platelet Volume


  6.2 FL


(6.5-10.1)  L


 


Neutrophils (%) (Auto)


  % (45.0-75.0)


 


 


Lymphocytes (%) (Auto)


  % (20.0-45.0)


 


 


Monocytes (%) (Auto)  % (1.0-10.0)  


 


Eosinophils (%) (Auto)  % (0.0-3.0)  


 


Basophils (%) (Auto)  % (0.0-2.0)  


 


Differential Total Cells


Counted 100  


 


 


Neutrophils % (Manual) 60 % (45-75)  


 


Lymphocytes % (Manual) 16 % (20-45)  L


 


Monocytes % (Manual) 3 % (1-10)  


 


Eosinophils % (Manual) 20 % (0-3)  H


 


Basophils % (Manual) 1 % (0-2)  


 


Band Neutrophils 0 % (0-8)  


 


Platelet Estimate Adequate  


 


Platelet Morphology Normal  


 


Hypochromasia 1+  


 


Anisocytosis 1+  


 


Sodium Level


  145 MMOL/L


(136-145)


 


Potassium Level


  3.6 MMOL/L


(3.5-5.1)


 


Chloride Level


  109 MMOL/L


()  H


 


Carbon Dioxide Level


  28 MMOL/L


(21-32)


 


Anion Gap


  8 mmol/L


(5-15)


 


Blood Urea Nitrogen


  36 mg/dL


(7-18)  H


 


Creatinine


  4.0 MG/DL


(0.55-1.30)  H


 


Estimat Glomerular Filtration


Rate  mL/min (>60)  


 


 


Glucose Level


  99 MG/DL


()


 


Calcium Level


  10.1 MG/DL


(8.5-10.1)








Height (Feet):  5


Height (Inches):  8.00


Weight (Pounds):  134


Medications





Current Medications








 Medications


  (Trade)  Dose


 Ordered  Sig/Simon


 Route


 PRN Reason  Start Time


 Stop Time Status Last Admin


Dose Admin


 


 Albuterol/


 Ipratropium


  (Albuterol/


 Ipratropium)  3 ml  Q6H  PRN


 HHN


 Shortness of Breath  3/13/19 19:30


 3/18/19 19:29   


 


 


 Amlodipine


 Besylate


  (Norvasc)  5 mg  DAILY


 GT


   3/14/19 09:00


 4/13/19 08:59  3/15/19 09:22


 


 


 Aspirin


  (ASA)  81 mg  DAILY


 GT


   3/14/19 09:00


 4/13/19 08:59  3/15/19 09:21


 


 


 Divalproex Sodium


  (Depakote


 Sprinkles)  250 mg  TID


 GT


   3/14/19 09:00


 4/13/19 08:59  3/15/19 12:26


 


 


 Heparin Sodium


  (Porcine)


  (Heparin 5000


 units/ml)  5,000 units  EVERY 12  HOURS


 SUBQ


   3/13/19 21:00


 4/12/19 20:59  3/15/19 09:24


 


 


 Heparin Sodium


  (Porcine)


  (Heparin Sod


 1000 units/ml


 10ml)  2,000 unit  ONCE


 IV


   3/15/19 18:30


 3/15/19 23:59   


 


 


 Labetalol HCl


  (Normodyne)  200 mg  Q24HRS


 GT


   3/13/19 21:00


 4/12/19 20:59  3/14/19 20:27


 


 


 Levetiracetam


  (Keppra)  500 mg  Q12HR


 GT


   3/13/19 21:00


 4/12/19 20:59  3/15/19 09:22


 


 


 Levofloxacin  50 ml @ 50


 mls/hr  Q48H


 IVPB


   3/13/19 21:00


 3/20/19 20:59  3/13/19 23:19


 


 


 Levothyroxine


 Sodium


  (Synthroid)  125 mcg  DAILY@0630


 GT


   3/14/19 06:30


 4/13/19 06:29  3/15/19 06:25


 


 


 Sodium Chloride  1,000 ml @ 


 500 mls/hr  Q2H PRN


 IVLG


 sbp<90 during hd  3/15/19 18:23


 3/15/19 23:59   


 


 


 Vitamin B Complex/


 Vit C/Folic Acid


  (Nephrovite)  1 tab  DAILY


 GT


   3/14/19 09:00


 4/13/19 08:59  3/15/19 09:21


 


 


 Vitamin D


  (Vitamin D)  1,000 intlu  DAILY


 GT


   3/14/19 09:00


 4/13/19 08:59  3/15/19 09:21


 











Assessment/Plan


Problem List:  


(1) Decubitus ulcer, heel


Assessment & Plan:  Pt presented on admission with partially opened DTPI L hip. 

Base of wound is maroon -indurated with full thickness opening centrally with 50

% slough.Dark skin tone without erythema or induration periwound.No odor or 

exudate noted.(L)5cm x (W)3.4cm.


Partially opened DTPI L heel. Medially heel is purple in colour with fluctuance 

with open wound laterally with small amt Biofilm. Wound in it's entirety 

medially to to laterally  measures (L)3cm x (W)6cm with open wound measuring (L)

1cm x (W))2.3cm. No odor noted.


Scattered partial thickness wounds with pink granulation noted to lateral and 

dorsal and lateral aspect of L foot.


Full thickness pressure injury R heel with 50% yellow slough;50% pink 

granulation .(+) maceration along borders.Small amt non-odorous serous exudate 

.Periwound without fluctuance or induration.(L)5cm x (W)3.4cm. 


Resolving pressure injury to Sacrum .Base of wound with pink epithelial tissue 

.No odor or exudate noted. Dark skin tone without induration or fluctuance 

periwound. 


Pt noted to have dry scaly plaques to trunk ,and both upper and lower 

extremities .Pt confirmed Hx of Psoriasis. Staff instructed to moisturize skin 

with Phytoplex Nourishing Cream Daily.





Tx.Plan. 


Cleanse L heel wound with Saline. Apply Therahoney.Apply Cavilon Skin Barrier 

periwound. Cover with ABD pad and wrap with Kerlix daily and prn.


            


Cleanse R heel with Saline.Apply Therahoney. Apply Cavilon SKin Barrier 

Periwound. Cover with ABD pad .Wrap with kerlix Daily and prn.


            


Apply Cavilon SKin Barrier to wounds lateral aspects of R and L foot and dorsal 

L foot Daily.


            


Cleanse L Hip Wound with Saline. Apply Therahoney .Apply Cavilon Skin Barrier 

wipe periwound. Cover with Optifoam drsg Daily and prn.


            


Apply Triad Paste to Sacrum .Cover with Optifoam drsg. Cover with Optifoam drsg 

.Change Q 3 days and prn.


            


APM/MARK mattress overlay.


            


Reposition at least every 2hours or as tolerated.Minimal positioning on L side.


            


Off-load heels with Pillow.


ICD Codes:  L89.609 - Pressure ulcer of unspecified heel, unspecified stage


SNOMED:  [id number]











Grant Bear Mar 15, 2019 13:34

## 2019-03-15 NOTE — NUR
NURSE NOTES: RECEIVED PATIENT LYING IN BED, AWAKE, ALERT/ORIENTED TO PERSON ONLY, 
HEMODIALYSIS ONGOING VIA LEFT UPPER ARM AV SHUNT/IRC DIALYSIS UNIT, NO ILL EFFECTS NOTED, 
VITALS STABLE, NO SIGNS AND SYMPTOMS OF ACUTE CARDIO RESPIRATORY DISTRESS/SHORTNESS OF 
BREATH. SIDE RAILS UP X3/BED IN LOWEST POSITION FOR SAFETY, SEIZURE PRECAUTIONS 
OBSERVED/SIDE RAILS PADDED. DISCHARGE AFTER DIALYSIS.

## 2019-03-15 NOTE — NUR
NURSE NOTES:

wound photo taken and uploaded. wound care treatment rendered. gtube drsg changed. awaiting 
for HD nurse.

## 2019-03-15 NOTE — NUR
NURSE NOTES:

Received patient A/A&Ox1, able to follow commands. No IV access, MD aware. On room air, no 
signs of distress or labored breathing. AV shunt on RFA, bruit and thrill present. Side 
rails x3. hob elevated for aspiration precaution. gtubed inplaced, patent and intact.  Bed 
in lowest position with call light in reach. Will continue with plan of care.

## 2019-03-15 NOTE — NUR
NURSE NOTES: TELEPHONE CALL PLACED TO [facility] REGARDING TRANSFERRING PATIENT TO 
[facility], ETA 45 MINUTES.

## 2019-03-15 NOTE — NEPHROLOGY PROGRESS NOTE
Assessment/Plan


Plan


Acute Bronchitis - resolving 


ESRD - HD today





Subjective


Subjective


Confused





Objective


Objective





Last 24 Hour Vital Signs








  Date Time  Temp Pulse Resp B/P (MAP) Pulse Ox O2 Delivery O2 Flow Rate FiO2


 


3/15/19 09:22  60  138/40    


 


3/15/19 09:00      Room Air  


 


3/15/19 08:46  68 20   Room Air  21


 


3/15/19 08:00 98.4 60 18 138/40 (72) 98   


 


3/15/19 04:00 97.7 68 18 154/58 (90) 100   


 


3/15/19 00:00 97.9 64 18 143/66 (91) 100   


 


3/14/19 23:40  62 20   Room Air  21


 


3/14/19 21:00      Room Air  


 


3/14/19 20:27  78  143/50    


 


3/14/19 20:00 97.9 71 18 143/50 (81) 97   


 


3/14/19 15:35 97.6 84 20 119/79 (92) 96   

















Intake and Output  


 


 3/14/19 3/15/19





 19:00 07:00


 


Intake Total 600 ml 340 ml


 


Output Total 400 ml 


 


Balance 200 ml 340 ml


 


  


 


Intake Free Water 180 ml 130 ml


 


Tube Feeding 420 ml 210 ml


 


Output Urine Total 400 ml 


 


# Voids  3


 


# Bowel Movements  1








Laboratory Tests


3/15/19 06:00: 


White Blood Count 8.7, Red Blood Count 4.34L, Hemoglobin 11.5L, Hematocrit 37.6L

, Mean Corpuscular Volume 87, Mean Corpuscular Hemoglobin 26.6L, Mean 

Corpuscular Hemoglobin Concent 30.7L, Red Cell Distribution Width 18.4H, 

Platelet Count 263, Mean Platelet Volume 6.2L, Neutrophils (%) (Auto) , 

Lymphocytes (%) (Auto) , Monocytes (%) (Auto) , Eosinophils (%) (Auto) , 

Basophils (%) (Auto) , Differential Total Cells Counted 100, Neutrophils % (

Manual) 60, Lymphocytes % (Manual) 16L, Monocytes % (Manual) 3, Eosinophils % (

Manual) 20H, Basophils % (Manual) 1, Band Neutrophils 0, Platelet Estimate 

Adequate, Platelet Morphology Normal, Hypochromasia 1+, Anisocytosis 1+, Sodium 

Level 145, Potassium Level 3.6, Chloride Level 109H, Carbon Dioxide Level 28, 

Anion Gap 8, Blood Urea Nitrogen 36H, Creatinine 4.0H, Estimat Glomerular 

Filtration Rate , Glucose Level 99, Calcium Level 10.1


Height (Feet):  5


Height (Inches):  8.00


Weight (Pounds):  134


Objective


CV RR


Lungs B wheezes


Abd SNT. BS +. PEG OK.


E B heel decubs.











Patricia Holley MD Mar 15, 2019 11:48

## 2019-03-15 NOTE — NUR
NURSE NOTES: PATIENT DISCHARGED VIA [facility] IN STABLE CONDITION, ACCOMPANIED BY 
2 ATTENDANTS, VITALS STABLE, AFEBRILE, NAD.

## 2019-03-15 NOTE — GENERAL PROGRESS NOTE
Assessment/Plan


Problem List:  


(1) Dementia with behavioral disturbance


ICD Codes:  F03.91 - Unspecified dementia with behavioral disturbance


SNOMED:  2951377777610


Status:  unchanged


Assessment/Plan


cont depakote


the pt lacks capacity to make decisions





Subjective


Neurologic/Psychiatric:  Reports: anxiety, depressed, emotional problems


Allergies:  


Coded Allergies:  


     No Known Allergies (Unverified , 3/20/18)





Objective





Last 24 Hour Vital Signs








  Date Time  Temp Pulse Resp B/P (MAP) Pulse Ox O2 Delivery O2 Flow Rate FiO2


 


3/15/19 16:00 98.2 68 18 99/56 (70) 100   


 


3/15/19 12:00 98.0 65 18 132/46 (74) 98   


 


3/15/19 09:22  60  138/40    


 


3/15/19 09:00      Room Air  


 


3/15/19 08:46  68 20   Room Air  21


 


3/15/19 08:00 98.4 60 18 138/40 (72) 98   


 


3/15/19 04:00 97.7 68 18 154/58 (90) 100   


 


3/15/19 00:00 97.9 64 18 143/66 (91) 100   


 


3/14/19 23:40  62 20   Room Air  21

















Intake and Output  


 


 3/14/19 3/15/19





 18:59 06:59


 


Intake Total 565 ml 375 ml


 


Output Total 400 ml 


 


Balance 165 ml 375 ml


 


  


 


Intake Free Water 180 ml 130 ml


 


Tube Feeding 385 ml 245 ml


 


Output Urine Total 400 ml 


 


# Voids  3


 


# Bowel Movements  1








Laboratory Tests


3/15/19 06:00: 


White Blood Count 8.7, Red Blood Count 4.34L, Hemoglobin 11.5L, Hematocrit 37.6L

, Mean Corpuscular Volume 87, Mean Corpuscular Hemoglobin 26.6L, Mean 

Corpuscular Hemoglobin Concent 30.7L, Red Cell Distribution Width 18.4H, 

Platelet Count 263, Mean Platelet Volume 6.2L, Neutrophils (%) (Auto) , 

Lymphocytes (%) (Auto) , Monocytes (%) (Auto) , Eosinophils (%) (Auto) , 

Basophils (%) (Auto) , Differential Total Cells Counted 100, Neutrophils % (

Manual) 60, Lymphocytes % (Manual) 16L, Monocytes % (Manual) 3, Eosinophils % (

Manual) 20H, Basophils % (Manual) 1, Band Neutrophils 0, Platelet Estimate 

Adequate, Platelet Morphology Normal, Hypochromasia 1+, Anisocytosis 1+, Sodium 

Level 145, Potassium Level 3.6, Chloride Level 109H, Carbon Dioxide Level 28, 

Anion Gap 8, Blood Urea Nitrogen 36H, Creatinine 4.0H, Estimat Glomerular 

Filtration Rate , Glucose Level 99, Calcium Level 10.1


Height (Feet):  5


Height (Inches):  8.00


Weight (Pounds):  134


General Appearance:  alert, confused, agitated











Angela Lambert MD Mar 15, 2019 21:28

## 2019-03-15 NOTE — NUR
*-* DISCHARGE PLANNED *-*



PATIENT IS DISCHARGED TO:



[facility]

ROOM# [id number]

SKILLED

T:[phone] FOR NURSE TO NURSE REPORT

[facility] HAS BEEN ARRANGED FOR 1430  S/W [address]ER[address] [id number]



*-* NOK NOTIFIED OF FATHERS D/C *-*

## 2019-03-17 NOTE — DISCHARGE SUMMARY
Discharge Summary


Discharge Summary


_


DATE OF ADMISSION: 3/13/2019





DATE OF DISCHARGE: 3/15/2019








DISCHARGED BY: Dr. Holley





REASON FOR ADMISSION: 


83 years old male with past medical history of end-stage renal disease, on 

hemodialysis, dysphagia, status post gastrostomy tube, gastroesophageal reflux 

disease, hypothyroidism, hypertensive cardiovascular disease, chronic 

obstructive pulmonary disease, presented to the hospital  for shortness of 

breath and hypotension.


Patient became acutely short of breath and hypotensive during dialysis.


Patient was admitted with suspicion of pneumonia versus acute bronchitis.


 


CONSULTANTS:


surgery Dr. Bear


psychiatrist 


 


[facility]ospit[facility] in [place] COURSE: 


Patient admitted to medical surgical floor.


Patient started on empiric antibiotics.


Supplemental oxygen titrated to keep pulse oximetry above 92%.


Bronchodilator treatment via hand-held nebulizer provided around-the-clock and 

as needed.


Skilled nursing facility medication resumed.


Hemodialysis was arranged as per with close monitoring of volumes and renal 

parameters and electrolytes.


Chest x-ray revealed no acute cardiopulmonary pathology.  No fever no 

leukocytosis.  Blood pressure stabilized.


DVT prophylaxis provided.


General surgeon seen patient for multiply wounds present on admission, 

including deep tissue pressure left hip partially open


Deep tissue pressure left heel partially open, full-thickness pressure injury 

right heel.


Wound care provided as per surgeon recommendation.


Overlay mattress provided.  Patient was repositioned every 2 hours.  Heels are 

offloaded with pillows.


Psychiatrist seen and evaluated patient.  Depakote was continued.  Per 

psychiatrist patient lacks capacity to make informed decision.


Patient clinically stabilized.


Pulse oximetry was stable on room air prior to discharge.  Patient was 

discharged to skilled nursing facility for continuation of care





FINAL DIAGNOSES: 


Acute bronchitis- resolving


End-stage renal disease, on hemodialysis


Dementia with behavioral disturbance


Decubitus ulcer right heel, present on admission


Decubitus ulcer left heel, present on admission


Decubitus ulcer left hip, present on admission


 





DISCHARGE MEDICATIONS:


See Medication Reconciliation list.





DISCHARGE INSTRUCTIONS:


Patient was discharged to the skilled nursing facility.


Follow up with medical doctor at the facility.








I have been assigned to dictate discharge summary for this account.


I was not involved in the patient's management.











Ariana Lozano NP Mar 17, 2019 20:12

## 2021-09-11 NOTE — NEPHROLOGY PROGRESS NOTE
Assessment/Plan


Plan


New PEG in. Dietary to recommend.


MRI of ankles, then DC to SNF





Subjective


Subjective


Confused. Post PEG. On HD now .Patient not DC'ed to SNF. Not accepting during 

the weekend.





Objective


Objective





Last 24 Hour Vital Signs








  Date Time  Temp Pulse Resp B/P (MAP) Pulse Ox O2 Delivery O2 Flow Rate FiO2


 


1/28/19 08:00 97.8 68 18 134/44 (74) 98   


 


1/28/19 08:00  67      


 


1/28/19 04:00 98.6 71 18 143/45 (77) 98   


 


1/28/19 03:53  68      


 


1/28/19 00:00 98.9 67 18 124/40 (68) 98   


 


1/27/19 23:53  68      


 


1/27/19 22:37    139/44    


 


1/27/19 22:37  71  139/44    


 


1/27/19 22:36  71  139/44    


 


1/27/19 21:00      Room Air  


 


1/27/19 20:01  71      


 


1/27/19 20:00 98.6 71 18 139/44 (75) 99   


 


1/27/19 18:40    132/47    


 


1/27/19 15:59 98.0 65 18 132/47 (75) 100   


 


1/27/19 15:21  64      


 


1/27/19 13:00    100/45    


 


1/27/19 12:00 98.3 59 18 100/45 (63) 100   


 


1/27/19 11:33  60      


 


1/27/19 09:50  72  142/53    


 


1/27/19 09:44    142/53    


 


1/27/19 09:44    142/53    


 


1/27/19 09:44  72  142/53    

















Intake and Output  


 


 1/27/19 1/28/19





 19:00 07:00


 


Intake Total 40 ml 360 ml


 


Output Total  0 ml


 


Balance 40 ml 360 ml


 


  


 


Free Water  40 ml


 


Tube Feeding 40 ml 320 ml


 


Output Urine Total  0 ml


 


# Voids 1 1








Laboratory Tests


1/28/19 06:10: 


White Blood Count 8.3, Red Blood Count 4.70, Hemoglobin 13.0L, Hematocrit 41.9L

, Mean Corpuscular Volume 89, Mean Corpuscular Hemoglobin 27.6, Mean 

Corpuscular Hemoglobin Concent 31.0L, Red Cell Distribution Width 19.4H, 

Platelet Count 215, Mean Platelet Volume 7.6, Neutrophils (%) (Auto) 63.9, 

Lymphocytes (%) (Auto) 11.8L, Monocytes (%) (Auto) 9.4, Eosinophils (%) (Auto) 

14.3H, Basophils (%) (Auto) 0.6, Sodium Level 136, Potassium Level 4.3, 

Chloride Level 101, Carbon Dioxide Level 28, Anion Gap 7, Blood Urea Nitrogen 

49H, Creatinine 5.9H, Estimat Glomerular Filtration Rate , Glucose Level 146H, 

Calcium Level 9.2


Height (Feet):  5


Height (Inches):  8.00


Weight (Pounds):  150


Objective


CV RR


Lungs CTA


Abd SNT. BS + Patricia Glynn MD Jan 28, 2019 09:42 167.64